# Patient Record
Sex: MALE | Race: BLACK OR AFRICAN AMERICAN | NOT HISPANIC OR LATINO | Employment: FULL TIME | URBAN - METROPOLITAN AREA
[De-identification: names, ages, dates, MRNs, and addresses within clinical notes are randomized per-mention and may not be internally consistent; named-entity substitution may affect disease eponyms.]

---

## 2019-07-03 ENCOUNTER — PATIENT OUTREACH (OUTPATIENT)
Dept: ADMINISTRATIVE | Facility: HOSPITAL | Age: 44
End: 2019-07-03

## 2019-07-03 DIAGNOSIS — Z13.6 ENCOUNTER FOR SCREENING FOR CARDIOVASCULAR DISORDERS: Primary | ICD-10-CM

## 2019-07-17 ENCOUNTER — HOSPITAL ENCOUNTER (OUTPATIENT)
Dept: RADIOLOGY | Facility: HOSPITAL | Age: 44
Discharge: HOME | End: 2019-07-17
Attending: INTERNAL MEDICINE
Payer: COMMERCIAL

## 2019-07-17 ENCOUNTER — OFFICE VISIT (OUTPATIENT)
Dept: FAMILY MEDICINE | Facility: CLINIC | Age: 44
End: 2019-07-17
Payer: COMMERCIAL

## 2019-07-17 VITALS
BODY MASS INDEX: 29.07 KG/M2 | SYSTOLIC BLOOD PRESSURE: 118 MMHG | DIASTOLIC BLOOD PRESSURE: 70 MMHG | OXYGEN SATURATION: 97 % | WEIGHT: 196.31 LBS | TEMPERATURE: 98 F | HEART RATE: 63 BPM | HEIGHT: 69 IN

## 2019-07-17 DIAGNOSIS — G89.29 CHRONIC NECK PAIN: ICD-10-CM

## 2019-07-17 DIAGNOSIS — M54.2 CHRONIC NECK PAIN: ICD-10-CM

## 2019-07-17 DIAGNOSIS — Z00.00 ROUTINE ADULT HEALTH MAINTENANCE: Primary | ICD-10-CM

## 2019-07-17 DIAGNOSIS — Z23 NEED FOR TDAP VACCINATION: ICD-10-CM

## 2019-07-17 DIAGNOSIS — Z12.5 SCREENING FOR PROSTATE CANCER: ICD-10-CM

## 2019-07-17 DIAGNOSIS — E66.3 OVERWEIGHT (BMI 25.0-29.9): ICD-10-CM

## 2019-07-17 PROBLEM — M50.30 DEGENERATIVE DISC DISEASE, CERVICAL: Status: ACTIVE | Noted: 2019-07-17

## 2019-07-17 PROBLEM — E78.5 DYSLIPIDEMIA: Status: ACTIVE | Noted: 2019-07-17

## 2019-07-17 PROCEDURE — 72040 X-RAY EXAM NECK SPINE 2-3 VW: CPT | Mod: 26,,, | Performed by: RADIOLOGY

## 2019-07-17 PROCEDURE — 99386 PR PREVENTIVE VISIT,NEW,40-64: ICD-10-PCS | Mod: 25,S$GLB,, | Performed by: INTERNAL MEDICINE

## 2019-07-17 PROCEDURE — 90715 TDAP VACCINE GREATER THAN OR EQUAL TO 7YO IM: ICD-10-PCS | Mod: S$GLB,,, | Performed by: INTERNAL MEDICINE

## 2019-07-17 PROCEDURE — 99999 PR PBB SHADOW E&M-EST. PATIENT-LVL III: CPT | Mod: PBBFAC,,, | Performed by: INTERNAL MEDICINE

## 2019-07-17 PROCEDURE — 90471 IMMUNIZATION ADMIN: CPT | Mod: S$GLB,,, | Performed by: INTERNAL MEDICINE

## 2019-07-17 PROCEDURE — 90471 TDAP VACCINE GREATER THAN OR EQUAL TO 7YO IM: ICD-10-PCS | Mod: S$GLB,,, | Performed by: INTERNAL MEDICINE

## 2019-07-17 PROCEDURE — 90715 TDAP VACCINE 7 YRS/> IM: CPT | Mod: S$GLB,,, | Performed by: INTERNAL MEDICINE

## 2019-07-17 PROCEDURE — 72040 XR CERVICAL SPINE AP LATERAL: ICD-10-PCS | Mod: 26,,, | Performed by: RADIOLOGY

## 2019-07-17 PROCEDURE — 72040 X-RAY EXAM NECK SPINE 2-3 VW: CPT | Mod: TC,FY,PO

## 2019-07-17 PROCEDURE — 99999 PR PBB SHADOW E&M-EST. PATIENT-LVL III: ICD-10-PCS | Mod: PBBFAC,,, | Performed by: INTERNAL MEDICINE

## 2019-07-17 PROCEDURE — 99386 PREV VISIT NEW AGE 40-64: CPT | Mod: 25,S$GLB,, | Performed by: INTERNAL MEDICINE

## 2019-07-17 NOTE — PROGRESS NOTES
Subjective:       Patient ID: Paul Frank is a 43 y.o. male.    Chief Complaint: Establish Care and Neck Pain (stiffness over the past x 1 year )    Chief Complaint   Patient presents with    Establish Care    Neck Pain     stiffness over the past x 1 year        HPI  Paul Frank is a 43 y.o. male with multiple medical diagnoses as listed in the medical history and problem list that presents for establishment of care.       Neck Pain    Pertinent negatives include no chest pain, headaches, numbness, tingling, trouble swallowing or weakness.   jhoan Rodriguez  Also has occ right shoulder pain   History of YUMI of R shoulder - no rotator cuff tear  Every so often with neck stiffness   Doesn't take meds        PAST MEDICAL HISTORY:  Past Medical History:   Diagnosis Date    Abnormal liver enzymes        PAST SURGICAL HISTORY:  Past Surgical History:   Procedure Laterality Date    FOOT FRACTURE SURGERY         SOCIAL HISTORY:  Social History     Socioeconomic History    Marital status:      Spouse name: john    Number of children: 2    Years of education: Not on file    Highest education level: Not on file   Occupational History    Occupation:      Employer: [employer]   Social Needs    Financial resource strain: Not hard at all    Food insecurity:     Worry: Never true     Inability: Never true    Transportation needs:     Medical: No     Non-medical: No   Tobacco Use    Smoking status: Never Smoker    Smokeless tobacco: Never Used   Substance and Sexual Activity    Alcohol use: Yes     Alcohol/week: 0.0 oz     Types: 1 - 2 Cans of beer per week     Frequency: Monthly or less     Drinks per session: 3 or 4     Binge frequency: Never     Comment: socially    Drug use: No    Sexual activity: Yes     Partners: Female   Lifestyle    Physical activity:     Days per week: 3 days     Minutes per session: 60 min    Stress: Not at all   Relationships    Social connections:     Talks on phone:  Twice a week     Gets together: Once a week     Attends [employer] service: Not on file     Active member of club or organization: Yes     Attends meetings of clubs or organizations: More than 4 times per year     Relationship status:    Other Topics Concern    Not on file   Social History Narrative    He exercises regularly.    He is a  - [place]       FAMILY HISTORY:  Family History   Problem Relation Age of Onset    Hyperlipidemia Mother     Prostate cancer Father     Coronary artery disease Father         late 60s/early 70s    Cancer Sister         lung cancer - 50s    No Known Problems Brother     Diabetes Maternal Grandfather     Colon cancer Neg Hx        ALLERGIES AND MEDICATIONS: updated and reviewed.  Review of patient's allergies indicates:  No Known Allergies  Current Outpatient Medications   Medication Sig Dispense Refill    ketoconazole 2 % Foam Use on affected area twice daily 100 g 3     No current facility-administered medications for this visit.          ROS  Review of Systems   Constitutional: Negative for activity change and unexpected weight change.   HENT: Negative for hearing loss, rhinorrhea and trouble swallowing.    Eyes: Negative for discharge and visual disturbance.   Respiratory: Negative for chest tightness and wheezing.    Cardiovascular: Negative for chest pain and palpitations.   Gastrointestinal: Negative for blood in stool, constipation, diarrhea and vomiting.   Endocrine: Negative for polydipsia and polyuria.   Genitourinary: Negative for difficulty urinating, hematuria and urgency.   Musculoskeletal: Positive for neck pain. Negative for arthralgias and joint swelling.   Neurological: Negative for tingling, weakness, numbness and headaches.   Psychiatric/Behavioral: Negative for confusion and dysphoric mood.           Objective:     Physical Exam  Vitals:    07/17/19 0832   BP: 118/70   BP Location: Left arm   Patient Position: Sitting   BP Method: Medium  "(Manual)   Pulse: 63   Temp: 98.3 °F (36.8 °C)   TempSrc: Oral   SpO2: 97%   Weight: 89 kg (196 lb 5.1 oz)   Height: 5' 9" (1.753 m)    Body mass index is 28.99 kg/m².  Weight: 89 kg (196 lb 5.1 oz)   Height: 5' 9" (175.3 cm)   Physical Exam   Constitutional: He appears well-developed and well-nourished. No distress.   HENT:   Head: Normocephalic and atraumatic.   Eyes: Pupils are equal, round, and reactive to light. Conjunctivae and EOM are normal.   Neck: Normal range of motion. Neck supple. No thyromegaly present.   Cardiovascular: Normal rate.   Pulmonary/Chest: Effort normal and breath sounds normal. No stridor. No respiratory distress. He has no wheezes. He has no rales.   Abdominal: Soft. He exhibits no distension. There is no tenderness. There is no guarding.   Musculoskeletal: He exhibits tenderness (R proximal trapezius).   Pain with right lateral side bending (left sided)   Neurological: He is alert. He displays normal reflexes. No cranial nerve deficit or sensory deficit. He exhibits normal muscle tone.   Skin: Skin is warm and dry. No rash noted.   Psychiatric: He has a normal mood and affect. His behavior is normal.         Assessment:     1. Routine adult health maintenance    2. Chronic neck pain    3. Need for Tdap vaccination    4. Overweight (BMI 25.0-29.9)    5. Screening for prostate cancer      Plan:     Paul was seen today for establish care and neck pain.    Diagnoses and all orders for this visit:    Routine adult health maintenance  Screening for prostate cancer  Discussed healthy diet, regular exercise, necessary labs, age appropriate cancer screening, and routine vaccinations.    -     CBC auto differential; Future  -     Comprehensive metabolic panel; Future  -     Lipid panel; Future  -     Hemoglobin A1c; Future  -     PSA, screening    Chronic neck pain  Patient with mild intermittent neck pain likely secondary to muscular strain  Will obtain imaging to evaluate for " osteoarthritis  Handout provided regarding stretching and conservative therapies  -     X-Ray Cervical Spine AP And Lateral; Future    Need for Tdap vaccination  Counseled regarding Tdap vaccination - administered  -     (In Office Administered) Tdap Vaccine    Overweight (BMI 25.0-29.9)  Body mass index is 28.99 kg/m².   We discussed dietary interventions for the management of weight and reduction of risk for chronic metabolic disease        Health Maintenance       Date Due Completion Date    TETANUS VACCINE 11/06/1991 ---    Lipid Panel 11/16/2017 11/16/2012    Influenza Vaccine 08/01/2019 11/12/2012          Health Maintenance reviewed, addressed as per orders    Follow-up: Follow up in about 1 year (around 7/17/2020) for routine physical.    The patient expressed understanding and no barriers to adherence were identified.     1. The patient indicates understanding of these issues and agrees with the plan. Brief care plan is updated and reviewed with the patient as applicable.     2. The patient is given an After Visit Summary that lists all medications with directions, allergies, orders placed during this encounter and follow-up instructions.     3. I have reviewed the patient's medical information including past medical, family, and social history sections including the medications and allergies.     4. We discussed the patient's current medications. I reconciled the patient's medication list and prepared and supplied needed refills.     Que Terrell MD  Internal Medicine-Pediatrics

## 2020-10-23 ENCOUNTER — OFFICE VISIT (OUTPATIENT)
Dept: FAMILY MEDICINE | Facility: CLINIC | Age: 45
End: 2020-10-23
Payer: COMMERCIAL

## 2020-10-23 ENCOUNTER — LAB VISIT (OUTPATIENT)
Dept: LAB | Facility: HOSPITAL | Age: 45
End: 2020-10-23
Attending: INTERNAL MEDICINE
Payer: COMMERCIAL

## 2020-10-23 VITALS
SYSTOLIC BLOOD PRESSURE: 122 MMHG | HEIGHT: 69 IN | BODY MASS INDEX: 26.96 KG/M2 | HEART RATE: 70 BPM | WEIGHT: 182 LBS | TEMPERATURE: 98 F | OXYGEN SATURATION: 97 % | DIASTOLIC BLOOD PRESSURE: 80 MMHG

## 2020-10-23 DIAGNOSIS — Z00.00 ROUTINE ADULT HEALTH MAINTENANCE: ICD-10-CM

## 2020-10-23 DIAGNOSIS — Z00.00 ROUTINE ADULT HEALTH MAINTENANCE: Primary | ICD-10-CM

## 2020-10-23 DIAGNOSIS — E78.5 DYSLIPIDEMIA: ICD-10-CM

## 2020-10-23 DIAGNOSIS — Z12.5 SCREENING FOR PROSTATE CANCER: ICD-10-CM

## 2020-10-23 DIAGNOSIS — E66.3 OVERWEIGHT (BMI 25.0-29.9): ICD-10-CM

## 2020-10-23 DIAGNOSIS — Z23 NEED FOR INFLUENZA VACCINATION: ICD-10-CM

## 2020-10-23 LAB
ALBUMIN SERPL BCP-MCNC: 4.3 G/DL (ref 3.5–5.2)
ALP SERPL-CCNC: 100 U/L (ref 55–135)
ALT SERPL W/O P-5'-P-CCNC: 31 U/L (ref 10–44)
ANION GAP SERPL CALC-SCNC: 8 MMOL/L (ref 8–16)
AST SERPL-CCNC: 30 U/L (ref 10–40)
BASOPHILS # BLD AUTO: 0.03 K/UL (ref 0–0.2)
BASOPHILS NFR BLD: 0.6 % (ref 0–1.9)
BILIRUB DIRECT SERPL-MCNC: 0.3 MG/DL (ref 0.1–0.3)
BILIRUB SERPL-MCNC: 0.6 MG/DL (ref 0.1–1)
BUN SERPL-MCNC: 12 MG/DL (ref 6–20)
CALCIUM SERPL-MCNC: 9.4 MG/DL (ref 8.7–10.5)
CHLORIDE SERPL-SCNC: 106 MMOL/L (ref 95–110)
CHOLEST SERPL-MCNC: 189 MG/DL (ref 120–199)
CHOLEST/HDLC SERPL: 3.9 {RATIO} (ref 2–5)
CO2 SERPL-SCNC: 27 MMOL/L (ref 23–29)
COMPLEXED PSA SERPL-MCNC: 0.66 NG/ML (ref 0–4)
CREAT SERPL-MCNC: 1.2 MG/DL (ref 0.5–1.4)
DIFFERENTIAL METHOD: ABNORMAL
EOSINOPHIL # BLD AUTO: 0.1 K/UL (ref 0–0.5)
EOSINOPHIL NFR BLD: 1.7 % (ref 0–8)
ERYTHROCYTE [DISTWIDTH] IN BLOOD BY AUTOMATED COUNT: 15.2 % (ref 11.5–14.5)
EST. GFR  (AFRICAN AMERICAN): >60 ML/MIN/1.73 M^2
EST. GFR  (NON AFRICAN AMERICAN): >60 ML/MIN/1.73 M^2
GLUCOSE SERPL-MCNC: 78 MG/DL (ref 70–110)
HCT VFR BLD AUTO: 45.6 % (ref 40–54)
HDLC SERPL-MCNC: 49 MG/DL (ref 40–75)
HDLC SERPL: 25.9 % (ref 20–50)
HGB BLD-MCNC: 13.5 G/DL (ref 14–18)
IMM GRANULOCYTES # BLD AUTO: 0 K/UL (ref 0–0.04)
IMM GRANULOCYTES NFR BLD AUTO: 0 % (ref 0–0.5)
LDLC SERPL CALC-MCNC: 128.2 MG/DL (ref 63–159)
LYMPHOCYTES # BLD AUTO: 1.1 K/UL (ref 1–4.8)
LYMPHOCYTES NFR BLD: 22.9 % (ref 18–48)
MCH RBC QN AUTO: 24.9 PG (ref 27–31)
MCHC RBC AUTO-ENTMCNC: 29.6 G/DL (ref 32–36)
MCV RBC AUTO: 84 FL (ref 82–98)
MONOCYTES # BLD AUTO: 0.5 K/UL (ref 0.3–1)
MONOCYTES NFR BLD: 9.5 % (ref 4–15)
NEUTROPHILS # BLD AUTO: 3.1 K/UL (ref 1.8–7.7)
NEUTROPHILS NFR BLD: 65.3 % (ref 38–73)
NONHDLC SERPL-MCNC: 140 MG/DL
NRBC BLD-RTO: 0 /100 WBC
PLATELET # BLD AUTO: 310 K/UL (ref 150–350)
PMV BLD AUTO: 10.8 FL (ref 9.2–12.9)
POTASSIUM SERPL-SCNC: 4.6 MMOL/L (ref 3.5–5.1)
PROT SERPL-MCNC: 7.3 G/DL (ref 6–8.4)
RBC # BLD AUTO: 5.42 M/UL (ref 4.6–6.2)
SODIUM SERPL-SCNC: 141 MMOL/L (ref 136–145)
TRIGL SERPL-MCNC: 59 MG/DL (ref 30–150)
WBC # BLD AUTO: 4.75 K/UL (ref 3.9–12.7)

## 2020-10-23 PROCEDURE — 99396 PR PREVENTIVE VISIT,EST,40-64: ICD-10-PCS | Mod: 25,S$GLB,, | Performed by: INTERNAL MEDICINE

## 2020-10-23 PROCEDURE — 85025 COMPLETE CBC W/AUTO DIFF WBC: CPT

## 2020-10-23 PROCEDURE — 80048 BASIC METABOLIC PNL TOTAL CA: CPT

## 2020-10-23 PROCEDURE — 90471 IMMUNIZATION ADMIN: CPT | Mod: S$GLB,,, | Performed by: INTERNAL MEDICINE

## 2020-10-23 PROCEDURE — 36415 COLL VENOUS BLD VENIPUNCTURE: CPT | Mod: PO

## 2020-10-23 PROCEDURE — 99999 PR PBB SHADOW E&M-EST. PATIENT-LVL III: CPT | Mod: PBBFAC,,, | Performed by: INTERNAL MEDICINE

## 2020-10-23 PROCEDURE — 99396 PREV VISIT EST AGE 40-64: CPT | Mod: 25,S$GLB,, | Performed by: INTERNAL MEDICINE

## 2020-10-23 PROCEDURE — 80076 HEPATIC FUNCTION PANEL: CPT

## 2020-10-23 PROCEDURE — 84153 ASSAY OF PSA TOTAL: CPT

## 2020-10-23 PROCEDURE — 90686 FLU VACCINE (QUAD) GREATER THAN OR EQUAL TO 3YO PRESERVATIVE FREE IM: ICD-10-PCS | Mod: S$GLB,,, | Performed by: INTERNAL MEDICINE

## 2020-10-23 PROCEDURE — 99999 PR PBB SHADOW E&M-EST. PATIENT-LVL III: ICD-10-PCS | Mod: PBBFAC,,, | Performed by: INTERNAL MEDICINE

## 2020-10-23 PROCEDURE — 80061 LIPID PANEL: CPT

## 2020-10-23 PROCEDURE — 90471 FLU VACCINE (QUAD) GREATER THAN OR EQUAL TO 3YO PRESERVATIVE FREE IM: ICD-10-PCS | Mod: S$GLB,,, | Performed by: INTERNAL MEDICINE

## 2020-10-23 PROCEDURE — 90686 IIV4 VACC NO PRSV 0.5 ML IM: CPT | Mod: S$GLB,,, | Performed by: INTERNAL MEDICINE

## 2020-10-23 NOTE — PATIENT INSTRUCTIONS
Prevention Guidelines, Men Ages 40 to 49  Screening tests and vaccines are an important part of managing your health. Health counseling is essential, too. Below are guidelines for these, for men ages 40 to 49. Talk with your healthcare provider to make sure youre up to date on what you need.  Screening Who needs it How often   Alcohol misuse All men in this age group At routine exams   Blood pressure All men in this age group Every 2 years if your blood pressure reading is less than 120/80 mm Hg; yearly if your systolic blood pressure reading is 120 to 139 mm Hg, or your diastolic blood pressure reading is 80 to 89 mm Hg   Depression All men in this age group At routine exams   Type 2 diabetes or prediabetes All adults beginning at age 45 and adults without symptoms at any age who are overweight or obese and have 1 or more other risk factors for diabetes At least every 3 years (yearly if blood sugar has begun to rise)   Hepatitis C Men at increased risk for infection - talk with your healthcare provider At routine exams   High cholesterol or triglycerides All men ages 35 and older, and younger men at high risk for coronary artery disease At least every 5 years   HIV All men At routine exams   Obesity All men in this age group At routine exams   Prostate cancer Starting at age 45, talk to healthcare provider about risks and benefits of digital rectal exam (BORIS) and prostate-specific antigen (PSA) screening1 At routine exams   Syphilis Men at increased risk for infection - talk with your healthcare provider At routine exams   Tuberculosis Men at increased risk for infection - talk with your healthcare provider Check with your healthcare provider   Vision All men in this age group Every 2 to 4 years if no risk factors for eye disease2   Vaccine Who needs it How often   Chickenpox (varicella) All men in this age group who have no record of this infection or vaccine 2 doses; the second dose should be given at least 4  weeks after the first dose   Hepatitis A Men at increased risk for infection - talk with your healthcare provider 2 doses given at least 6 months apart   Hepatitis B Men at increased risk for infection - talk with your healthcare provider 3 doses over 6 months; second dose should be given 1 month after the first dose; the third dose should be given at least 2 months after the second dose and at least 4 months after the first dose   Haemophilus influenzae Type B (HIB) Men at increased risk for infection - talk with your healthcare provider 1 to 3 doses   Influenza (flu) All men in this age group Once a year   Measles, mumps, rubella (MMR) All men in this age group who have no record of these infections or vaccines 1 or 2 doses   Meningococcal Men at increased risk for infection - talk with your healthcare provider 1 or more doses   Pneumococcal conjugate vaccine (PCV13) and pneumococcal polysaccharide vaccine (PPSV23) Men at increased risk for infection - talk with your healthcare provider PCV13: 1 dose ages 19 to 65 (protects against 13 types of pneumococcal bacteria)     PPSV23: 1 to 2 doses through age 64, or 1 dose at 65 or older (protects against 23 types of pneumococcal bacteria)      Tetanus/diphtheria/  pertussis (Td/Tdap) booster All men in this age group Td every 10 years, or a one-time dose of Tdap instead of a Td booster after age 18, then Td every 10 years   Counseling Who needs it How often   Diet and exercise Men who are overweight or obese When diagnosed, and then at routine exams   Sexually transmitted infection prevention Men at increased risk for infection - talk with your healthcare provider At routine exams   Use of daily aspirin Men ages 45 to 79 at risk for cardiovascular health problems At routine exams   Use of tobacco and the health effects it can cause All men in this age group Every exam   [address] [place] [address] [facility]   [facility] [employer] [facility]  Date Last Reviewed:  2/1/2017  © 2194-2629 [employer]. [address] [place] [address], [place] [address]. All rights reserved. This information is not intended as a substitute for professional medical care. Always follow your healthcare professional's instructions.

## 2020-10-23 NOTE — PROGRESS NOTES
Subjective:       Chief Complaint  Chief Complaint   Patient presents with    Annual Exam       HPI  Paul Frank is a 44 y.o. male with multiple medical diagnoses as listed in the medical history and problem list that presents for above complaint(s).    Has been exercising multiple times a week  Has changed his eating habits significantly  Eats lean meats, egg whites, fish, lean protein  No major symptoms other than occasional knee pain    Patient Care Team:  Que Terrell MD as PCP - General (Internal Medicine)  Sav Bran MD as Consulting Physician (Ophthalmology)  Alysha Cheung LPN as Care Coordinator      PAST MEDICAL HISTORY:  Past Medical History:   Diagnosis Date    Abnormal liver enzymes        PAST SURGICAL HISTORY:  Past Surgical History:   Procedure Laterality Date    FOOT FRACTURE SURGERY         SOCIAL HISTORY:  Social History     Socioeconomic History    Marital status:      Spouse name: john    Number of children: 2    Years of education: Not on file    Highest education level: Not on file   Occupational History    Occupation:      Employer: [employer]   Social Needs    Financial resource strain: Not hard at all    Food insecurity     Worry: Never true     Inability: Never true    Transportation needs     Medical: No     Non-medical: No   Tobacco Use    Smoking status: Never Smoker    Smokeless tobacco: Never Used   Substance and Sexual Activity    Alcohol use: Yes     Alcohol/week: 0.0 standard drinks     Types: 1 - 2 Cans of beer per week     Frequency: Monthly or less     Drinks per session: 1 or 2     Binge frequency: Never     Comment: socially    Drug use: No    Sexual activity: Yes     Partners: Female   Lifestyle    Physical activity     Days per week: 5 days     Minutes per session: 40 min    Stress: To some extent   Relationships    Social connections     Talks on phone: Once a week     Gets together: Once a week     Attends Yazdanism service:  "Not on file     Active member of club or organization: Yes     Attends meetings of clubs or organizations: 1 to 4 times per year     Relationship status:    Other Topics Concern    Not on file   Social History Narrative    He exercises regularly.    He is a  - [employer]       FAMILY HISTORY:  Family History   Problem Relation Age of Onset    Hyperlipidemia Mother     Prostate cancer Father     Coronary artery disease Father         late 60s/early 70s    Cancer Sister         lung cancer - 50s    No Known Problems Brother     Diabetes Maternal Grandfather     Colon cancer Neg Hx        ALLERGIES AND MEDICATIONS: updated and reviewed.  Review of patient's allergies indicates:  No Known Allergies  Current Outpatient Medications   Medication Sig Dispense Refill    ketoconazole 2 % Foam Use on affected area twice daily 100 g 3     No current facility-administered medications for this visit.          ROS  Review of Systems   Constitutional: Negative.    Eyes: Positive for visual disturbance (wears glasses).   Respiratory: Negative.    Genitourinary: Negative.    Musculoskeletal: Positive for arthralgias (knees, right elbow). Negative for back pain.   Hematological: Negative.    Psychiatric/Behavioral: Negative.  Negative for dysphoric mood. The patient is not nervous/anxious.          Objective:       Physical Exam  Vitals:    10/23/20 0822   BP: 122/80   BP Location: Left arm   Patient Position: Sitting   BP Method: Large (Manual)   Pulse: 70   Temp: 98 °F (36.7 °C)   TempSrc: Oral   SpO2: 97%   Weight: 82.6 kg (181 lb 15.8 oz)   Height: 5' 9" (1.753 m)    Body mass index is 26.88 kg/m².  Weight: 82.6 kg (181 lb 15.8 oz)   Height: 5' 9" (175.3 cm)   Physical Exam  Vitals signs reviewed.   Constitutional:       General: He is not in acute distress.     Appearance: He is well-developed.   HENT:      Head: Normocephalic and atraumatic.      Right Ear: External ear normal.      Left Ear: External ear " normal.      Nose: Nose normal.   Eyes:      Pupils: Pupils are equal, round, and reactive to light.   Neck:      Musculoskeletal: Normal range of motion and neck supple.      Thyroid: No thyromegaly.   Cardiovascular:      Rate and Rhythm: Normal rate.      Heart sounds: Normal heart sounds. No murmur.   Pulmonary:      Effort: Pulmonary effort is normal. No respiratory distress.      Breath sounds: Normal breath sounds. No stridor.   Abdominal:      General: Bowel sounds are normal. There is no distension.      Palpations: Abdomen is soft. There is no mass.      Tenderness: There is no abdominal tenderness. There is no guarding.      Hernia: No hernia is present.   Musculoskeletal: Normal range of motion.         General: No tenderness.   Skin:     General: Skin is warm and dry.      Findings: No erythema or rash.   Neurological:      Mental Status: He is alert.      Cranial Nerves: No cranial nerve deficit.   Psychiatric:         Behavior: Behavior normal.             Assessment:     1. Routine adult health maintenance    2. Overweight (BMI 25.0-29.9)    3. Dyslipidemia    4. Screening for prostate cancer    5. Need for influenza vaccination      Plan:     Paul was seen today for annual exam.    Diagnoses and all orders for this visit:    Routine adult health maintenance  Overweight (BMI 25.0-29.9)  Discussed healthy diet, regular exercise, necessary labs, age appropriate cancer screening, and routine vaccinations.    Discussed colon cancer screening consideration for current age interval  Patient to check insurance regarding coverage   -     CBC auto differential; Future  -     Basic Metabolic Panel; Future  -     Hepatic Function Panel; Future    Dyslipidemia  Noted elevated lipids in 2019 - repeat presently  Discussed dietary interventions  -     Lipid Panel; Future    Screening for prostate cancer  Family history of prostate cancer - obtain PSA  -     PSA, Screening; Future    Need for influenza  vaccination  Counseled regarding seasonal influenza vaccination - administered  -     Influenza - Quadrivalent *Preferred* (6 months+) (PF)          Health Maintenance       Date Due Completion Date    Influenza Vaccine (1) 08/01/2020 11/12/2012    Lipid Panel 07/17/2024 7/17/2019    TETANUS VACCINE 07/17/2029 7/17/2019            Health Maintenance reviewed, addressed as per orders    Follow up in about 1 year (around 10/23/2021) for CPE.    The patient expressed understanding and no barriers to adherence were identified.     1. The patient indicates understanding of these issues and agrees with the plan. Brief care plan is updated and reviewed with the patient as applicable.     2. The patient is given an After Visit Summary that lists all medications with directions, allergies, orders placed during this encounter and follow-up instructions.     3. I have reviewed the patient's medical information including past medical, family, and social history sections including the medications and allergies.     4. We discussed the patient's current medications. I reconciled the patient's medication list and prepared and supplied needed refills.       Que Terrell MD  Internal Medicine-Pediatrics

## 2021-04-12 ENCOUNTER — PATIENT MESSAGE (OUTPATIENT)
Dept: RESEARCH | Facility: HOSPITAL | Age: 46
End: 2021-04-12

## 2022-04-13 DIAGNOSIS — Z12.11 COLON CANCER SCREENING: ICD-10-CM

## 2022-05-31 ENCOUNTER — PATIENT MESSAGE (OUTPATIENT)
Dept: ADMINISTRATIVE | Facility: HOSPITAL | Age: 47
End: 2022-05-31
Payer: COMMERCIAL

## 2022-05-31 ENCOUNTER — PATIENT OUTREACH (OUTPATIENT)
Dept: ADMINISTRATIVE | Facility: HOSPITAL | Age: 47
End: 2022-05-31
Payer: COMMERCIAL

## 2022-05-31 NOTE — PROGRESS NOTES
HM and immunizations reviewed/ updated.  [employer] will be mailed out to patient and patient is aware.

## 2022-07-03 DIAGNOSIS — M25.561 PAIN IN BOTH KNEES, UNSPECIFIED CHRONICITY: Primary | ICD-10-CM

## 2022-07-03 DIAGNOSIS — M25.562 PAIN IN BOTH KNEES, UNSPECIFIED CHRONICITY: Primary | ICD-10-CM

## 2022-07-07 ENCOUNTER — OFFICE VISIT (OUTPATIENT)
Dept: ORTHOPEDICS | Facility: CLINIC | Age: 47
End: 2022-07-07
Attending: ORTHOPAEDIC SURGERY
Payer: COMMERCIAL

## 2022-07-07 VITALS
HEIGHT: 69 IN | HEART RATE: 70 BPM | OXYGEN SATURATION: 99 % | WEIGHT: 181 LBS | SYSTOLIC BLOOD PRESSURE: 120 MMHG | RESPIRATION RATE: 15 BRPM | BODY MASS INDEX: 26.81 KG/M2 | DIASTOLIC BLOOD PRESSURE: 77 MMHG

## 2022-07-07 DIAGNOSIS — S76.119A STRAIN OF QUADRICEPS, UNSPECIFIED LATERALITY, INITIAL ENCOUNTER: Primary | ICD-10-CM

## 2022-07-07 PROCEDURE — 99999 PR PBB SHADOW E&M-EST. PATIENT-LVL III: ICD-10-PCS | Mod: PBBFAC,,, | Performed by: ORTHOPAEDIC SURGERY

## 2022-07-07 PROCEDURE — 99203 OFFICE O/P NEW LOW 30 MIN: CPT | Mod: S$GLB,,, | Performed by: ORTHOPAEDIC SURGERY

## 2022-07-07 PROCEDURE — 99203 PR OFFICE/OUTPT VISIT, NEW, LEVL III, 30-44 MIN: ICD-10-PCS | Mod: S$GLB,,, | Performed by: ORTHOPAEDIC SURGERY

## 2022-07-07 PROCEDURE — 99999 PR PBB SHADOW E&M-EST. PATIENT-LVL III: CPT | Mod: PBBFAC,,, | Performed by: ORTHOPAEDIC SURGERY

## 2022-07-07 NOTE — PROGRESS NOTES
NEW PATIENT ORTHOPAEDIC: Knee    PRIMARY CARE PHYSICIAN: Que Terrell MD   REFERRING PROVIDER: Esau Ernst MD  [address] [facility]  [place] [address]     ASSESSMENT & PLAN:    Impression:  Bilateral Knee Quadriceps tendonitis     Follow Up Plan: PRN     Non operative care:    Paul Frank has physical exam evidence of above and wishes to pursue an non-operative care. I am recommending the following: activity modification.  Patient reports a 1 week history of bilateral knee pain.  This pains over the anterior portion of his knee.  It is reproduced with deep knee flexion and squatting.  He denies any mechanical symptoms.  No instability.  No previous knee pain.  The onset of this was when he was doing squats and began to feel a pull over the anterior lateral aspect of his left knee.  His pain has been gradually improving.  He has not done anything or taking any medications to help with his pain.  He denies any sort of effusion or swelling.  Clinically he has no significant findings.  He localizes some portion of his pain to the quadriceps insertion when I had him do body squats but it is not tender to palpation.  I think with activity modification and observation he will get back to his full activities in the coming weeks.  If not I advised a call we could try oral medicines or see him back for new clinical exam.    The patient has been ordered:  None    CONSULTS:   None    ACTIVE PROBLEM LIST  Patient Active Problem List   Diagnosis    Degenerative disc disease, cervical    Overweight (BMI 25.0-29.9)    Dyslipidemia           SUBJECTIVE    CHIEF COMPLAINT: Knee Pain    HPI:   Paul Frank is a 46 y.o. male here for evaluation and management of bilateral knee pain. There is a specific incident that brought about this pain, squatting. he has had progressive problems with the knee(s) starting 1 weeks ago but is now progressing to interfere with activities which include: exercise    Currently the pain in the  "joint is rated at mild with activity. The pain is intermittent and is located in the knee, located anterior. The pain is described as aching. Relieving factors include rest.     There is not associated Catching, Clicking and Popping.     Paul Frank has no additional complaints.     PROGRESSIVE SYMPTOMS:  Pain limiting ability to stay fit and healthy    FUNCTIONAL STATUS:   Participate in recreational activities     PREVIOUS TREATMENTS:  Medical: None  Physical Therapy: None  Previous Orthopaedic Surgery: None    REVIEW OF SYSTEMS:  PAIN ASSESSMENT:  See HPI.  MUSCULOSKELETAL: See HPI.  OTHER 10 point review of systems is negative except as stated in HPI above    PAST MEDICAL HISTORY   has a past medical history of Abnormal liver enzymes.     PAST SURGICAL HISTORY   has a past surgical history that includes Foot fracture surgery.     FAMILY HISTORY  family history includes Cancer in his sister; Coronary artery disease in his father; Diabetes in his maternal grandfather; Hyperlipidemia in his mother; No Known Problems in his brother; Prostate cancer in his father.     SOCIAL HISTORY   reports that he has never smoked. He has never used smokeless tobacco. He reports current alcohol use. He reports that he does not use drugs.     ALLERGIES   Review of patient's allergies indicates:  No Known Allergies     MEDICATIONS  Current Outpatient Medications on File Prior to Visit   Medication Sig Dispense Refill    ketoconazole 2 % Foam Use on affected area twice daily (Patient not taking: Reported on 7/7/2022) 100 g 3     No current facility-administered medications on file prior to visit.          PHYSICAL EXAM   height is 5' 9" (1.753 m) and weight is 82.1 kg (181 lb). His blood pressure is 120/77 and his pulse is 70. His respiration is 15 and oxygen saturation is 99%.   Body mass index is 26.73 kg/m².      All other systems deferred.  GENERAL:  No acute distress  HABITUS: Normal  GAIT: Non-antalgic  SKIN: Normal     KNEE " EXAM:    bilateral:   Effusion: No joint effusion  TTP: no over Medial/Lateral joint line, MCL, LCL, IT band, Pes bursa   no crepitus with passive knee ROM  Passive ROM: Extension 0, Flexion 130  No pain with manipulation of patella  Stable to varus/valgus stress. No increased laxity to anterior/posterior drawer testing  negative Allison's test  No pain with IR/ER rotation of the hip  5/5 strength in knee flexion and extension, ankle plantarflexion and dorsiflexion  Neurovascular Status: Sensation intact to light touch in Sural, Saphenous, SPN, DPN, Tibial nerve distribution  2+ pulse DP/PT, normal capillary refill, foot has normal warmth    DATA:  Diagnostic tests reviewed for today's visit:     The obtained knee radiographs appears normal for age. There is good alignment with no evidence of fracture, bony abnormalities or signficant degenerative changes.

## 2022-09-08 ENCOUNTER — OFFICE VISIT (OUTPATIENT)
Dept: FAMILY MEDICINE | Facility: CLINIC | Age: 47
End: 2022-09-08
Payer: COMMERCIAL

## 2022-09-08 ENCOUNTER — LAB VISIT (OUTPATIENT)
Dept: LAB | Facility: HOSPITAL | Age: 47
End: 2022-09-08
Attending: INTERNAL MEDICINE
Payer: COMMERCIAL

## 2022-09-08 VITALS
OXYGEN SATURATION: 95 % | WEIGHT: 183.56 LBS | HEART RATE: 60 BPM | SYSTOLIC BLOOD PRESSURE: 100 MMHG | HEIGHT: 69 IN | TEMPERATURE: 98 F | DIASTOLIC BLOOD PRESSURE: 70 MMHG | BODY MASS INDEX: 27.19 KG/M2

## 2022-09-08 DIAGNOSIS — K64.9 HEMORRHOIDS, UNSPECIFIED HEMORRHOID TYPE: ICD-10-CM

## 2022-09-08 DIAGNOSIS — Z00.00 ROUTINE ADULT HEALTH MAINTENANCE: ICD-10-CM

## 2022-09-08 DIAGNOSIS — M50.30 DEGENERATIVE DISC DISEASE, CERVICAL: ICD-10-CM

## 2022-09-08 DIAGNOSIS — Z00.00 ROUTINE ADULT HEALTH MAINTENANCE: Primary | ICD-10-CM

## 2022-09-08 DIAGNOSIS — Z12.11 COLON CANCER SCREENING: ICD-10-CM

## 2022-09-08 LAB
ALBUMIN SERPL BCP-MCNC: 4.3 G/DL (ref 3.5–5.2)
ALP SERPL-CCNC: 85 U/L (ref 55–135)
ALT SERPL W/O P-5'-P-CCNC: 29 U/L (ref 10–44)
ANION GAP SERPL CALC-SCNC: 8 MMOL/L (ref 8–16)
AST SERPL-CCNC: 27 U/L (ref 10–40)
BASOPHILS # BLD AUTO: 0.02 K/UL (ref 0–0.2)
BASOPHILS NFR BLD: 0.4 % (ref 0–1.9)
BILIRUB SERPL-MCNC: 0.8 MG/DL (ref 0.1–1)
BUN SERPL-MCNC: 14 MG/DL (ref 6–20)
CALCIUM SERPL-MCNC: 9.5 MG/DL (ref 8.7–10.5)
CHLORIDE SERPL-SCNC: 106 MMOL/L (ref 95–110)
CHOLEST SERPL-MCNC: 210 MG/DL (ref 120–199)
CHOLEST/HDLC SERPL: 3.6 {RATIO} (ref 2–5)
CO2 SERPL-SCNC: 25 MMOL/L (ref 23–29)
CREAT SERPL-MCNC: 1.1 MG/DL (ref 0.5–1.4)
DIFFERENTIAL METHOD: ABNORMAL
EOSINOPHIL # BLD AUTO: 0.1 K/UL (ref 0–0.5)
EOSINOPHIL NFR BLD: 2.8 % (ref 0–8)
ERYTHROCYTE [DISTWIDTH] IN BLOOD BY AUTOMATED COUNT: 15.4 % (ref 11.5–14.5)
EST. GFR  (NO RACE VARIABLE): >60 ML/MIN/1.73 M^2
GLUCOSE SERPL-MCNC: 85 MG/DL (ref 70–110)
HCT VFR BLD AUTO: 46.5 % (ref 40–54)
HDLC SERPL-MCNC: 59 MG/DL (ref 40–75)
HDLC SERPL: 28.1 % (ref 20–50)
HGB BLD-MCNC: 14.3 G/DL (ref 14–18)
IMM GRANULOCYTES # BLD AUTO: 0.01 K/UL (ref 0–0.04)
IMM GRANULOCYTES NFR BLD AUTO: 0.2 % (ref 0–0.5)
LDLC SERPL CALC-MCNC: 139.6 MG/DL (ref 63–159)
LYMPHOCYTES # BLD AUTO: 1.5 K/UL (ref 1–4.8)
LYMPHOCYTES NFR BLD: 30.4 % (ref 18–48)
MCH RBC QN AUTO: 25.6 PG (ref 27–31)
MCHC RBC AUTO-ENTMCNC: 30.8 G/DL (ref 32–36)
MCV RBC AUTO: 83 FL (ref 82–98)
MONOCYTES # BLD AUTO: 0.6 K/UL (ref 0.3–1)
MONOCYTES NFR BLD: 11.4 % (ref 4–15)
NEUTROPHILS # BLD AUTO: 2.7 K/UL (ref 1.8–7.7)
NEUTROPHILS NFR BLD: 54.8 % (ref 38–73)
NONHDLC SERPL-MCNC: 151 MG/DL
NRBC BLD-RTO: 0 /100 WBC
PLATELET # BLD AUTO: 336 K/UL (ref 150–450)
PMV BLD AUTO: 10.9 FL (ref 9.2–12.9)
POTASSIUM SERPL-SCNC: 4.1 MMOL/L (ref 3.5–5.1)
PROT SERPL-MCNC: 7.4 G/DL (ref 6–8.4)
RBC # BLD AUTO: 5.58 M/UL (ref 4.6–6.2)
SODIUM SERPL-SCNC: 139 MMOL/L (ref 136–145)
TRIGL SERPL-MCNC: 57 MG/DL (ref 30–150)
WBC # BLD AUTO: 4.93 K/UL (ref 3.9–12.7)

## 2022-09-08 PROCEDURE — 36415 COLL VENOUS BLD VENIPUNCTURE: CPT | Mod: PO | Performed by: INTERNAL MEDICINE

## 2022-09-08 PROCEDURE — 99999 PR PBB SHADOW E&M-EST. PATIENT-LVL IV: ICD-10-PCS | Mod: PBBFAC,,, | Performed by: INTERNAL MEDICINE

## 2022-09-08 PROCEDURE — 80053 COMPREHEN METABOLIC PANEL: CPT | Performed by: INTERNAL MEDICINE

## 2022-09-08 PROCEDURE — 99396 PR PREVENTIVE VISIT,EST,40-64: ICD-10-PCS | Mod: S$GLB,,, | Performed by: INTERNAL MEDICINE

## 2022-09-08 PROCEDURE — 85025 COMPLETE CBC W/AUTO DIFF WBC: CPT | Performed by: INTERNAL MEDICINE

## 2022-09-08 PROCEDURE — 99396 PREV VISIT EST AGE 40-64: CPT | Mod: S$GLB,,, | Performed by: INTERNAL MEDICINE

## 2022-09-08 PROCEDURE — 99999 PR PBB SHADOW E&M-EST. PATIENT-LVL IV: CPT | Mod: PBBFAC,,, | Performed by: INTERNAL MEDICINE

## 2022-09-08 PROCEDURE — 80061 LIPID PANEL: CPT | Performed by: INTERNAL MEDICINE

## 2022-09-08 NOTE — PATIENT INSTRUCTIONS
For COLONOSCOPY/ENDOSCOPY scheduling, please call [phone]  Hours: Monday - Friday 730AM - 5PM

## 2022-09-08 NOTE — PROGRESS NOTES
Subjective:       Chief Complaint  Chief Complaint   Patient presents with    Annual Exam       HPI  Paul Frank is a 46 y.o. male with multiple medical diagnoses as listed in the medical history and problem list that presents for above complaint(s).    Has been exercising multiple times a week  Has changed his eating habits significantly  Eats lean meats, egg whites, fish, lean protein    Still experiencing right knee pain   Had a negative x-ray  previously     Does have work-related stressors at times     Wearing glasses- nearsighted    Blood in stool - had a hemorrhoid a few weeks ago that caused bright red blood in his stool in addition to discomfort with defecation  Has resolved    Occ neck pain - when turning neck to one side     Patient Care Team:  Que Terrell MD as PCP - General (Internal Medicine)  Sav Bran MD as Consulting Physician (Ophthalmology)  Carloz Aguirre MA as Care Coordinator      PAST MEDICAL HISTORY:  Past Medical History:   Diagnosis Date    Abnormal liver enzymes        PAST SURGICAL HISTORY:  Past Surgical History:   Procedure Laterality Date    FOOT FRACTURE SURGERY         SOCIAL HISTORY:  Social History     Socioeconomic History    Marital status:      Spouse name: john    Number of children: 2   Occupational History    Occupation:      Employer: [employer]   Tobacco Use    Smoking status: Never    Smokeless tobacco: Never   Substance and Sexual Activity    Alcohol use: Yes     Alcohol/week: 0.0 standard drinks     Types: 1 - 2 Cans of beer per week     Comment: socially    Drug use: No    Sexual activity: Yes     Partners: Female   Social History Narrative    He exercises regularly.    He is a  - [employer]     Social Determinants of Health     Financial Resource Strain: Low Risk     Difficulty of Paying Living Expenses: Not hard at all   Food Insecurity: No Food Insecurity    Worried About Running Out of Food in the Last Year: Never true    Ran Out of Food  in the Last Year: Never true   Transportation Needs: No Transportation Needs    Lack of Transportation (Medical): No    Lack of Transportation (Non-Medical): No   Physical Activity: Sufficiently Active    Days of Exercise per Week: 5 days    Minutes of Exercise per Session: 90 min   Stress: No Stress Concern Present    Feeling of Stress : Not at all   Social Connections: Unknown    Frequency of Communication with Friends and Family: Twice a week    Frequency of Social Gatherings with Friends and Family: Once a week    Active Member of Clubs or Organizations: Yes    Attends Club or Organization Meetings: More than 4 times per year    Marital Status:    Housing Stability: Low Risk     Unable to Pay for Housing in the Last Year: No    Number of Places Lived in the Last Year: 1    Unstable Housing in the Last Year: No       FAMILY HISTORY:  Family History   Problem Relation Age of Onset    Hyperlipidemia Mother     Prostate cancer Father     Coronary artery disease Father         late 60s/early 70s    Cancer Sister         lung cancer - 50s    No Known Problems Brother     Diabetes Maternal Grandfather     Colon cancer Neg Hx        ALLERGIES AND MEDICATIONS: updated and reviewed.  Review of patient's allergies indicates:  No Known Allergies  No current outpatient medications on file.     No current facility-administered medications for this visit.         ROS  Review of Systems   Constitutional: Negative.  Negative for activity change and unexpected weight change.   HENT:  Negative for hearing loss, rhinorrhea and trouble swallowing.    Eyes:  Positive for visual disturbance (wears glasses). Negative for discharge.   Respiratory: Negative.  Negative for chest tightness and wheezing.    Cardiovascular:  Negative for chest pain and palpitations.   Gastrointestinal:  Positive for blood in stool. Negative for constipation, diarrhea and vomiting.   Endocrine: Negative for polydipsia and polyuria.   Genitourinary:  "Negative.  Negative for difficulty urinating and hematuria.   Musculoskeletal:  Positive for arthralgias (knees, right elbow) and neck pain. Negative for back pain and joint swelling.   Neurological:  Negative for weakness and headaches.   Hematological: Negative.    Psychiatric/Behavioral: Negative.  Negative for confusion and dysphoric mood. The patient is not nervous/anxious.        Objective:       Physical Exam  Vitals:    09/08/22 0751   BP: 100/70   Pulse: 60   Temp: 98 °F (36.7 °C)   TempSrc: Oral   SpO2: 95%   Weight: 83.3 kg (183 lb 8.5 oz)   Height: 5' 9" (1.753 m)    Body mass index is 27.1 kg/m².  Weight: 83.3 kg (183 lb 8.5 oz)   Height: 5' 9" (175.3 cm)   Physical Exam  Vitals reviewed.   Constitutional:       General: He is not in acute distress.     Appearance: He is well-developed.   HENT:      Head: Normocephalic and atraumatic.      Right Ear: External ear normal.      Left Ear: External ear normal.      Nose: Nose normal.   Eyes:      Pupils: Pupils are equal, round, and reactive to light.   Neck:      Thyroid: No thyromegaly.   Cardiovascular:      Rate and Rhythm: Normal rate.      Heart sounds: Normal heart sounds. No murmur heard.  Pulmonary:      Effort: Pulmonary effort is normal. No respiratory distress.      Breath sounds: Normal breath sounds. No stridor.   Abdominal:      General: Bowel sounds are normal. There is no distension.      Palpations: Abdomen is soft. There is no mass.      Tenderness: There is no abdominal tenderness. There is no guarding.      Hernia: No hernia is present.   Musculoskeletal:         General: No tenderness. Normal range of motion.      Cervical back: Normal range of motion and neck supple.   Skin:     General: Skin is warm and dry.      Findings: No erythema or rash.   Neurological:      Mental Status: He is alert.      Cranial Nerves: No cranial nerve deficit.   Psychiatric:         Behavior: Behavior normal.           Assessment:     1. Routine adult " health maintenance    2. Hemorrhoids, unspecified hemorrhoid type    3. Degenerative disc disease, cervical    4. Colon cancer screening        Plan:     Paul was seen today for annual exam.    Diagnoses and all orders for this visit:    Routine adult health maintenance  Overweight (BMI 25.0-29.9)  Discussed healthy diet, regular exercise, necessary labs, age appropriate cancer screening, and routine vaccinations.    Discussed colon cancer screening consideration for current age interval  Patient to check insurance regarding coverage   -     CBC auto differential; Future  -     Basic Metabolic Panel; Future  -     Hepatic Function Panel; Future    Dyslipidemia  Noted elevated lipids in 2019 - repeat presently  Discussed dietary interventions  -     Lipid Panel; Future    Screening for colon cancer  Adults 45 to 75 years of age should be screened for colorectal cancer. Colonoscopy and fecal stool testing discussed with respect to risks and benefits/advantages and disadvantages of each screening method. Patient aware that it is recommended that a positive Cologuard screen or FIT test be clinically correlated and followed-up with a structural examination of the colon such asdiagnostic colonoscopy. Patient consents to colon cancer screening method as follows  - COLONOSCOPY      Health Maintenance         Date Due Completion Date    Influenza Vaccine (1) 08/01/2020 11/12/2012    Lipid Panel 07/17/2024 7/17/2019    TETANUS VACCINE 07/17/2029 7/17/2019              Health Maintenance reviewed, addressed as per orders    No follow-ups on file.    The patient expressed understanding and no barriers to adherence were identified.     1. The patient indicates understanding of these issues and agrees with the plan. Brief care plan is updated and reviewed with the patient as applicable.     2. The patient is given an After Visit Summary that lists all medications with directions, allergies, orders placed during this encounter  and follow-up instructions.     3. I have reviewed the patient's medical information including past medical, family, and social history sections including the medications and allergies.     4. We discussed the patient's current medications. I reconciled the patient's medication list and prepared and supplied needed refills.       Que Terrell MD  Internal Medicine-Pediatrics

## 2022-09-12 ENCOUNTER — PATIENT MESSAGE (OUTPATIENT)
Dept: ADMINISTRATIVE | Facility: HOSPITAL | Age: 47
End: 2022-09-12
Payer: COMMERCIAL

## 2022-11-25 ENCOUNTER — CLINICAL SUPPORT (OUTPATIENT)
Dept: ENDOSCOPY | Facility: HOSPITAL | Age: 47
End: 2022-11-25
Attending: INTERNAL MEDICINE
Payer: COMMERCIAL

## 2022-11-25 VITALS — HEIGHT: 69 IN | WEIGHT: 184 LBS | BODY MASS INDEX: 27.25 KG/M2

## 2022-11-25 DIAGNOSIS — Z12.11 COLON CANCER SCREENING: ICD-10-CM

## 2022-11-25 RX ORDER — POLYETHYLENE GLYCOL 3350, SODIUM SULFATE ANHYDROUS, SODIUM BICARBONATE, SODIUM CHLORIDE, POTASSIUM CHLORIDE 236; 22.74; 6.74; 5.86; 2.97 G/4L; G/4L; G/4L; G/4L; G/4L
4 POWDER, FOR SOLUTION ORAL ONCE
Qty: 4000 ML | Refills: 0 | Status: SHIPPED | OUTPATIENT
Start: 2022-11-25 | End: 2022-11-25

## 2022-12-04 ENCOUNTER — ANESTHESIA EVENT (OUTPATIENT)
Dept: ENDOSCOPY | Facility: HOSPITAL | Age: 47
End: 2022-12-04
Payer: COMMERCIAL

## 2022-12-04 RX ORDER — SODIUM CHLORIDE, SODIUM LACTATE, POTASSIUM CHLORIDE, CALCIUM CHLORIDE 600; 310; 30; 20 MG/100ML; MG/100ML; MG/100ML; MG/100ML
INJECTION, SOLUTION INTRAVENOUS CONTINUOUS
Status: CANCELLED | OUTPATIENT
Start: 2022-12-04

## 2022-12-04 RX ORDER — LIDOCAINE HYDROCHLORIDE 10 MG/ML
1 INJECTION, SOLUTION EPIDURAL; INFILTRATION; INTRACAUDAL; PERINEURAL ONCE
Status: CANCELLED | OUTPATIENT
Start: 2022-12-04 | End: 2022-12-04

## 2022-12-05 ENCOUNTER — HOSPITAL ENCOUNTER (OUTPATIENT)
Facility: HOSPITAL | Age: 47
Discharge: HOME | End: 2022-12-05
Attending: INTERNAL MEDICINE | Admitting: INTERNAL MEDICINE
Payer: COMMERCIAL

## 2022-12-05 ENCOUNTER — ANESTHESIA (OUTPATIENT)
Dept: ENDOSCOPY | Facility: HOSPITAL | Age: 47
End: 2022-12-05
Payer: COMMERCIAL

## 2022-12-05 VITALS
SYSTOLIC BLOOD PRESSURE: 124 MMHG | RESPIRATION RATE: 18 BRPM | TEMPERATURE: 1 F | DIASTOLIC BLOOD PRESSURE: 66 MMHG | HEART RATE: 50 BPM | OXYGEN SATURATION: 100 %

## 2022-12-05 DIAGNOSIS — Z12.11 SCREENING FOR COLON CANCER: Primary | ICD-10-CM

## 2022-12-05 PROCEDURE — 88305 TISSUE EXAM BY PATHOLOGIST: ICD-10-PCS | Mod: 26,,, | Performed by: PATHOLOGY

## 2022-12-05 PROCEDURE — 88305 TISSUE EXAM BY PATHOLOGIST: CPT | Performed by: PATHOLOGY

## 2022-12-05 PROCEDURE — 37000008 HC ANESTHESIA 1ST 15 MINUTES: Performed by: INTERNAL MEDICINE

## 2022-12-05 PROCEDURE — 45380 COLONOSCOPY AND BIOPSY: CPT | Mod: PT | Performed by: INTERNAL MEDICINE

## 2022-12-05 PROCEDURE — 25000003 PHARM REV CODE 250: Performed by: INTERNAL MEDICINE

## 2022-12-05 PROCEDURE — 25000003 PHARM REV CODE 250: Performed by: NURSE ANESTHETIST, CERTIFIED REGISTERED

## 2022-12-05 PROCEDURE — D9220A PRA ANESTHESIA: ICD-10-PCS | Mod: 33,CRNA,, | Performed by: NURSE ANESTHETIST, CERTIFIED REGISTERED

## 2022-12-05 PROCEDURE — D9220A PRA ANESTHESIA: Mod: 33,CRNA,, | Performed by: NURSE ANESTHETIST, CERTIFIED REGISTERED

## 2022-12-05 PROCEDURE — 37000009 HC ANESTHESIA EA ADD 15 MINS: Performed by: INTERNAL MEDICINE

## 2022-12-05 PROCEDURE — 63600175 PHARM REV CODE 636 W HCPCS: Performed by: NURSE ANESTHETIST, CERTIFIED REGISTERED

## 2022-12-05 PROCEDURE — 45380 PR COLONOSCOPY,BIOPSY: ICD-10-PCS | Mod: 33,,, | Performed by: INTERNAL MEDICINE

## 2022-12-05 PROCEDURE — 27201012 HC FORCEPS, HOT/COLD, DISP: Performed by: INTERNAL MEDICINE

## 2022-12-05 PROCEDURE — 45380 COLONOSCOPY AND BIOPSY: CPT | Mod: 33,,, | Performed by: INTERNAL MEDICINE

## 2022-12-05 PROCEDURE — D9220A PRA ANESTHESIA: ICD-10-PCS | Mod: 33,ANES,, | Performed by: ANESTHESIOLOGY

## 2022-12-05 PROCEDURE — 88305 TISSUE EXAM BY PATHOLOGIST: CPT | Mod: 26,,, | Performed by: PATHOLOGY

## 2022-12-05 PROCEDURE — D9220A PRA ANESTHESIA: Mod: 33,ANES,, | Performed by: ANESTHESIOLOGY

## 2022-12-05 RX ORDER — PROPOFOL 10 MG/ML
VIAL (ML) INTRAVENOUS
Status: DISCONTINUED | OUTPATIENT
Start: 2022-12-05 | End: 2022-12-05

## 2022-12-05 RX ORDER — LIDOCAINE HYDROCHLORIDE 20 MG/ML
INJECTION, SOLUTION EPIDURAL; INFILTRATION; INTRACAUDAL; PERINEURAL
Status: DISCONTINUED
Start: 2022-12-05 | End: 2022-12-05 | Stop reason: HOSPADM

## 2022-12-05 RX ORDER — LIDOCAINE HYDROCHLORIDE 20 MG/ML
INJECTION INTRAVENOUS
Status: DISCONTINUED | OUTPATIENT
Start: 2022-12-05 | End: 2022-12-05

## 2022-12-05 RX ORDER — SODIUM CHLORIDE 9 MG/ML
INJECTION, SOLUTION INTRAVENOUS CONTINUOUS
Status: DISCONTINUED | OUTPATIENT
Start: 2022-12-05 | End: 2022-12-05 | Stop reason: HOSPADM

## 2022-12-05 RX ORDER — PROPOFOL 10 MG/ML
INJECTION, EMULSION INTRAVENOUS
Status: DISCONTINUED
Start: 2022-12-05 | End: 2022-12-05 | Stop reason: HOSPADM

## 2022-12-05 RX ADMIN — PROPOFOL 50 MG: 10 INJECTION, EMULSION INTRAVENOUS at 11:12

## 2022-12-05 RX ADMIN — LIDOCAINE HYDROCHLORIDE 100 MG: 20 INJECTION, SOLUTION INTRAVENOUS at 11:12

## 2022-12-05 RX ADMIN — PROPOFOL 200 MG: 10 INJECTION, EMULSION INTRAVENOUS at 11:12

## 2022-12-05 RX ADMIN — SODIUM CHLORIDE: 0.9 INJECTION, SOLUTION INTRAVENOUS at 11:12

## 2022-12-05 NOTE — TRANSFER OF CARE
Anesthesia Transfer of Care Note    Patient: Paul Frank    Procedure(s) Performed: Procedure(s) (LRB):  COLONOSCOPY (N/A)    Patient location: GI    Anesthesia Type: general    Transport from OR: Transported from OR on room air with adequate spontaneous ventilation    Post pain: adequate analgesia    Post assessment: no apparent anesthetic complications    Post vital signs: stable    Level of consciousness: responds to stimulation and sedated    Nausea/Vomiting: no nausea/vomiting    Complications: none    Transfer of care protocol was followed      Last vitals:   Visit Vitals  /66 (BP Location: Left arm, Patient Position: Lying)   Pulse 67   Temp (!) -17.3 °C (0.8 °F) (Axillary)   Resp 16   SpO2 96%

## 2022-12-05 NOTE — PROVATION PATIENT INSTRUCTIONS
Discharge Summary/Instructions after an Endoscopic Procedure  Patient Name: Paul Frank  Patient MRN: [id number]  Patient YOB: 1975 Monday, December 5, 2022  Nicolle Salcedo MD  Dear patient,  As a result of recent federal legislation (The Federal Cures Act), you may   receive lab or pathology results from your procedure in your [facility]   account before your physician is able to contact you. Your physician or   their representative will relay the results to you with their   recommendations at their soonest availability.  Thank you,  RESTRICTIONS:  During your procedure today, you received medications for sedation.  These   medications may affect your judgment, balance and coordination.  Therefore,   for 24 hours, you have the following restrictions:   - DO NOT drive a car, operate machinery, make legal/financial decisions,   sign important papers or drink alcohol.    ACTIVITY:  Today: no heavy lifting, straining or running due to procedural   sedation/anesthesia.  The following day: return to full activity including work.  DIET:  Eat and drink normally unless instructed otherwise.     TREATMENT FOR COMMON SIDE EFFECTS:  - Mild abdominal pain, nausea, belching, bloating or excessive gas:  rest,   eat lightly and use a heating pad.  - Sore Throat: treat with throat lozenges and/or gargle with warm salt   water.  - Because air was used during the procedure, expelling large amounts of air   from your rectum or belching is normal.  - If a bowel prep was taken, you may not have a bowel movement for 1-3 days.    This is normal.  SYMPTOMS TO WATCH FOR AND REPORT TO YOUR PHYSICIAN:  1. Abdominal pain or bloating, other than gas cramps.  2. Chest pain.  3. Back pain.  4. Signs of infection such as: chills or fever occurring within 24 hours   after the procedure.  5. Rectal bleeding, which would show as bright red, maroon, or black stools.   (A tablespoon of blood from the rectum is not serious, especially if    hemorrhoids are present.)  6. Vomiting.  7. Weakness or dizziness.  GO DIRECTLY TO THE NEAREST EMERGENCY ROOM IF YOU HAVE ANY OF THE FOLLOWING:      Difficulty breathing              Chills and/or fever over 101 F   Persistent vomiting and/or vomiting blood   Severe abdominal pain   Severe chest pain   Black, tarry stools   Bleeding- more than one tablespoon   Any other symptom or condition that you feel may need urgent attention  Your doctor recommends these additional instructions:  If any biopsies were taken, your doctors clinic will contact you in 1 to 2   weeks with any results.  - Discharge patient to home.   - High fiber diet  - Continue present medications.   - Await pathology results.   - Repeat colonoscopy for surveillance based on pathology results.  For questions, problems or results please call your physician - Nicolle Salcedo MD at Work:  [phone].  [facility] can be reached at [phone]     IF A COMPLICATION OR EMERGENCY SITUATION ARISES AND YOU ARE UNABLE TO REACH   YOUR PHYSICIAN - GO DIRECTLY TO THE EMERGENCY ROOM.  Nicolle Salcedo MD  12/5/2022 11:32:13 AM  This report has been verified and signed electronically.  Dear patient,  As a result of recent federal legislation (The Federal Cures Act), you may   receive lab or pathology results from your procedure in your [facility]   account before your physician is able to contact you. Your physician or   their representative will relay the results to you with their   recommendations at their soonest availability.  Thank you,  PROVATION

## 2022-12-05 NOTE — H&P
Short Stay Endoscopy History and Physical    PCP - Que Terrell MD    Procedure - Colonoscopy  ASA - per anesthesia  Mallampati - per anesthesia  History of Anesthesia problems - no  Family history Anesthesia problems - no   Plan of anesthesia - General, MAC    HPI:  This is a 47 y.o. male here for evaluation of : asymptomatic screening exam      ROS:  Constitutional: No fevers, chills, No weight loss  CV: No chest pain  Pulm: No cough, No shortness of breath  GI: see HPI  Derm: No rash    Medical History:  has a past medical history of Abnormal liver enzymes.    Surgical History:  has a past surgical history that includes Foot fracture surgery.    Family History: family history includes Cancer in his sister; Coronary artery disease in his father; Diabetes in his maternal grandfather; Hyperlipidemia in his mother; No Known Problems in his brother; Prostate cancer in his father.. Otherwise no colon cancer, inflammatory bowel disease, or GI malignancies.    Social History:  reports that he has never smoked. He has never used smokeless tobacco. He reports current alcohol use. He reports that he does not use drugs.    Review of patient's allergies indicates:  No Known Allergies    Medications:   No medications prior to admission.         Physical Exam:    Vital Signs: There were no vitals filed for this visit.    Gen: NAD, lying comfortably  HENT: NCAT, oropharynx clear  Eyes: anicteric sclerae, EOMI grossly  Neck: supple, no visible masses/goiter  Cardiac: RRR  Lungs: non-labored breathing  Abd: soft, NT/ND, normoactive BS  Ext: no LE edema, warm, well perfused  Skin: skin intact on exposed body parts, no visible rashes, lesions  Neuro: A&Ox4, neuro exam grossly intact, moves all extremities  Psych: appropriate mood, affect        Labs:  Lab Results   Component Value Date    WBC 4.93 09/08/2022    HGB 14.3 09/08/2022    HCT 46.5 09/08/2022     09/08/2022    CHOL 210 (H) 09/08/2022    TRIG 57 09/08/2022     HDL 59 09/08/2022    ALT 29 09/08/2022    AST 27 09/08/2022     09/08/2022    K 4.1 09/08/2022     09/08/2022    CREATININE 1.1 09/08/2022    BUN 14 09/08/2022    CO2 25 09/08/2022    TSH 1.997 11/16/2012    PSA 0.66 10/23/2020    HGBA1C 5.6 07/17/2019       Plan:  Colonoscopy for CRC screening    I have explained the risks and benefits of endoscopy procedures to the patient including but not limited to bleeding, perforation, infection, and death.  The patient was asked if they understand and allowed to ask any further questions to their satisfaction.    Nicolle Salcedo MD

## 2022-12-06 LAB
FINAL PATHOLOGIC DIAGNOSIS: NORMAL
GROSS: NORMAL
Lab: NORMAL

## 2022-12-07 ENCOUNTER — TELEPHONE (OUTPATIENT)
Dept: GASTROENTEROLOGY | Facility: CLINIC | Age: 47
End: 2022-12-07
Payer: COMMERCIAL

## 2022-12-07 DIAGNOSIS — Z12.11 ENCOUNTER FOR SCREENING COLONOSCOPY: Primary | ICD-10-CM

## 2022-12-07 NOTE — TELEPHONE ENCOUNTER
Reviewed colonoscopy biopsy results with patient via phone - normal intestinal tissue    We discussed repeating colonoscopy in ~3 months to take a second look at the area (moreso behind the IC valve) to ensure no polyp is there. This is likely just some protruding small bowel as the path shows however will take a second look to ensure this.    Endo referral placed and message sent to schedulers as well    Nicolle Salcedo MD

## 2022-12-19 NOTE — ANESTHESIA PREPROCEDURE EVALUATION
12/19/2022  Paul Frank is a 47 y.o., male.      Pre-op Assessment     I have reviewed the Nursing Notes.       Review of Systems  Anesthesia Hx:  No problems with previous Anesthesia    Social:  Non-Smoker    Cardiovascular:   Exercise tolerance: good Denies Pacemaker.  Denies CABG/stent.     Pulmonary:  Pulmonary Normal    Renal/:  Renal/ Normal     Hepatic/GI:   Bowel Prep.    Musculoskeletal:   Arthritis     Neurological:  Neurology Normal    Endocrine:  Endocrine Normal        Physical Exam  General: Well nourished, Cooperative, Alert and Oriented    Airway:  Mallampati: III   Mouth Opening: Normal  TM Distance: Normal  Tongue: Normal  Neck ROM: Normal ROM        Anesthesia Plan  Type of Anesthesia, risks & benefits discussed:    Anesthesia Type: Gen Natural Airway  Intra-op Monitoring Plan: Standard ASA Monitors  Induction:  IV  Informed Consent: Informed consent signed with the Patient and all parties understand the risks and agree with anesthesia plan.  All questions answered.   ASA Score: 1  Day of Surgery Review of History & Physical: H&P Update referred to the surgeon/provider.    Ready For Surgery From Anesthesia Perspective.     .

## 2022-12-19 NOTE — ANESTHESIA POSTPROCEDURE EVALUATION
Anesthesia Post Evaluation    Patient: Paul Frank    Procedure(s) Performed: Procedure(s) (LRB):  COLONOSCOPY (N/A)    Final Anesthesia Type: general      Patient location during evaluation: GI PACU  Patient participation: Yes- Able to Participate  Level of consciousness: awake and alert  Post-procedure vital signs: reviewed and stable  Pain management: adequate  Airway patency: patent    PONV status at discharge: No PONV  Anesthetic complications: no      Cardiovascular status: blood pressure returned to baseline and hemodynamically stable  Respiratory status: unassisted and spontaneous ventilation  Hydration status: euvolemic  Follow-up not needed.          Vitals Value Taken Time   /66 12/05/22 1200   Temp -17.3 °C (0.8 °F) 12/05/22 1129   Pulse 50 12/05/22 1200   Resp 18 12/05/22 1200   SpO2 100 % 12/05/22 1200         Event Time   Out of Recovery 12:07:32         Pain/Maikol Score: No data recorded

## 2023-01-24 ENCOUNTER — PATIENT MESSAGE (OUTPATIENT)
Dept: ENDOSCOPY | Facility: HOSPITAL | Age: 48
End: 2023-01-24

## 2023-01-24 ENCOUNTER — CLINICAL SUPPORT (OUTPATIENT)
Dept: ENDOSCOPY | Facility: HOSPITAL | Age: 48
End: 2023-01-24
Attending: INTERNAL MEDICINE
Payer: COMMERCIAL

## 2023-01-24 VITALS — BODY MASS INDEX: 27.4 KG/M2 | WEIGHT: 185 LBS | HEIGHT: 69 IN

## 2023-01-24 DIAGNOSIS — Z12.11 ENCOUNTER FOR SCREENING COLONOSCOPY: ICD-10-CM

## 2023-01-24 RX ORDER — POLYETHYLENE GLYCOL 3350, SODIUM SULFATE ANHYDROUS, SODIUM BICARBONATE, SODIUM CHLORIDE, POTASSIUM CHLORIDE 236; 22.74; 6.74; 5.86; 2.97 G/4L; G/4L; G/4L; G/4L; G/4L
4 POWDER, FOR SOLUTION ORAL ONCE
Qty: 4000 ML | Refills: 0 | Status: SHIPPED | OUTPATIENT
Start: 2023-01-24 | End: 2023-01-24

## 2023-03-09 ENCOUNTER — ANESTHESIA EVENT (OUTPATIENT)
Dept: ENDOSCOPY | Facility: HOSPITAL | Age: 48
End: 2023-03-09
Payer: COMMERCIAL

## 2023-03-09 RX ORDER — SODIUM CHLORIDE, SODIUM LACTATE, POTASSIUM CHLORIDE, CALCIUM CHLORIDE 600; 310; 30; 20 MG/100ML; MG/100ML; MG/100ML; MG/100ML
INJECTION, SOLUTION INTRAVENOUS CONTINUOUS
Status: CANCELLED | OUTPATIENT
Start: 2023-03-09

## 2023-03-09 RX ORDER — LIDOCAINE HYDROCHLORIDE 10 MG/ML
1 INJECTION, SOLUTION EPIDURAL; INFILTRATION; INTRACAUDAL; PERINEURAL ONCE
Status: CANCELLED | OUTPATIENT
Start: 2023-03-09 | End: 2023-03-09

## 2023-03-10 ENCOUNTER — ANESTHESIA (OUTPATIENT)
Dept: ENDOSCOPY | Facility: HOSPITAL | Age: 48
End: 2023-03-10
Payer: COMMERCIAL

## 2023-03-10 ENCOUNTER — HOSPITAL ENCOUNTER (OUTPATIENT)
Facility: HOSPITAL | Age: 48
Discharge: HOME | End: 2023-03-10
Attending: INTERNAL MEDICINE | Admitting: INTERNAL MEDICINE
Payer: COMMERCIAL

## 2023-03-10 VITALS
RESPIRATION RATE: 20 BRPM | TEMPERATURE: 98 F | SYSTOLIC BLOOD PRESSURE: 111 MMHG | DIASTOLIC BLOOD PRESSURE: 58 MMHG | OXYGEN SATURATION: 100 % | HEART RATE: 50 BPM

## 2023-03-10 DIAGNOSIS — Z13.9 ENCOUNTER FOR SCREENING: ICD-10-CM

## 2023-03-10 PROCEDURE — 45378 PR COLONOSCOPY,DIAGNOSTIC: ICD-10-PCS | Mod: ,,, | Performed by: INTERNAL MEDICINE

## 2023-03-10 PROCEDURE — 37000008 HC ANESTHESIA 1ST 15 MINUTES: Performed by: INTERNAL MEDICINE

## 2023-03-10 PROCEDURE — D9220A PRA ANESTHESIA: ICD-10-PCS | Mod: ANES,,, | Performed by: ANESTHESIOLOGY

## 2023-03-10 PROCEDURE — 37000009 HC ANESTHESIA EA ADD 15 MINS: Performed by: INTERNAL MEDICINE

## 2023-03-10 PROCEDURE — 63600175 PHARM REV CODE 636 W HCPCS: Performed by: NURSE ANESTHETIST, CERTIFIED REGISTERED

## 2023-03-10 PROCEDURE — D9220A PRA ANESTHESIA: Mod: CRNA,,, | Performed by: NURSE ANESTHETIST, CERTIFIED REGISTERED

## 2023-03-10 PROCEDURE — 45378 DIAGNOSTIC COLONOSCOPY: CPT | Performed by: INTERNAL MEDICINE

## 2023-03-10 PROCEDURE — 25000003 PHARM REV CODE 250: Performed by: NURSE ANESTHETIST, CERTIFIED REGISTERED

## 2023-03-10 PROCEDURE — D9220A PRA ANESTHESIA: Mod: ANES,,, | Performed by: ANESTHESIOLOGY

## 2023-03-10 PROCEDURE — 45378 DIAGNOSTIC COLONOSCOPY: CPT | Mod: ,,, | Performed by: INTERNAL MEDICINE

## 2023-03-10 PROCEDURE — D9220A PRA ANESTHESIA: ICD-10-PCS | Mod: CRNA,,, | Performed by: NURSE ANESTHETIST, CERTIFIED REGISTERED

## 2023-03-10 RX ORDER — PROPOFOL 10 MG/ML
VIAL (ML) INTRAVENOUS
Status: DISCONTINUED | OUTPATIENT
Start: 2023-03-10 | End: 2023-03-10

## 2023-03-10 RX ORDER — PROPOFOL 10 MG/ML
INJECTION, EMULSION INTRAVENOUS
Status: DISCONTINUED
Start: 2023-03-10 | End: 2023-03-10 | Stop reason: HOSPADM

## 2023-03-10 RX ORDER — SODIUM CHLORIDE 9 MG/ML
INJECTION, SOLUTION INTRAVENOUS CONTINUOUS
Status: DISCONTINUED | OUTPATIENT
Start: 2023-03-10 | End: 2023-03-10 | Stop reason: HOSPADM

## 2023-03-10 RX ORDER — LIDOCAINE HYDROCHLORIDE 20 MG/ML
INJECTION, SOLUTION EPIDURAL; INFILTRATION; INTRACAUDAL; PERINEURAL
Status: DISCONTINUED
Start: 2023-03-10 | End: 2023-03-10 | Stop reason: HOSPADM

## 2023-03-10 RX ORDER — LIDOCAINE HYDROCHLORIDE 20 MG/ML
INJECTION INTRAVENOUS
Status: DISCONTINUED | OUTPATIENT
Start: 2023-03-10 | End: 2023-03-10

## 2023-03-10 RX ADMIN — SODIUM CHLORIDE: 0.9 INJECTION, SOLUTION INTRAVENOUS at 10:03

## 2023-03-10 RX ADMIN — PROPOFOL 20 MG: 10 INJECTION, EMULSION INTRAVENOUS at 10:03

## 2023-03-10 RX ADMIN — PROPOFOL 100 MG: 10 INJECTION, EMULSION INTRAVENOUS at 10:03

## 2023-03-10 RX ADMIN — LIDOCAINE HYDROCHLORIDE 100 MG: 20 INJECTION, SOLUTION INTRAVENOUS at 10:03

## 2023-03-10 NOTE — TRANSFER OF CARE
Anesthesia Transfer of Care Note    Patient: Paul Frank    Procedure(s) Performed: Procedure(s) (LRB):  COLONOSCOPY (N/A)    Patient location: GI    Anesthesia Type: general    Transport from OR: Transported from OR on room air with adequate spontaneous ventilation    Post pain: adequate analgesia    Post assessment: no apparent anesthetic complications and tolerated procedure well    Post vital signs: stable    Level of consciousness: lethargic and responds to stimulation    Nausea/Vomiting: no nausea/vomiting    Complications: none    Transfer of care protocol was followed      Last vitals:   Visit Vitals  /66 (BP Location: Left arm, Patient Position: Lying)   Pulse 72   Temp 36.6 °C (97.9 °F) (Oral)   Resp 15   SpO2 99%

## 2023-03-10 NOTE — PROVATION PATIENT INSTRUCTIONS
Discharge Summary/Instructions after an Endoscopic Procedure  Patient Name: Paul Frank  Patient MRN: [id number]  Patient YOB: 1975  Friday, March 10, 2023  Mert Pitt MD  Dear patient,  As a result of recent federal legislation (The Federal Cures Act), you may   receive lab or pathology results from your procedure in your [facility]   account before your physician is able to contact you. Your physician or   their representative will relay the results to you with their   recommendations at their soonest availability.  Thank you,  RESTRICTIONS:  During your procedure today, you received medications for sedation.  These   medications may affect your judgment, balance and coordination.  Therefore,   for 24 hours, you have the following restrictions:   - DO NOT drive a car, operate machinery, make legal/financial decisions,   sign important papers or drink alcohol.    ACTIVITY:  Today: no heavy lifting, straining or running due to procedural   sedation/anesthesia.  The following day: return to full activity including work.  DIET:  Eat and drink normally unless instructed otherwise.     TREATMENT FOR COMMON SIDE EFFECTS:  - Mild abdominal pain, nausea, belching, bloating or excessive gas:  rest,   eat lightly and use a heating pad.  - Sore Throat: treat with throat lozenges and/or gargle with warm salt   water.  - Because air was used during the procedure, expelling large amounts of air   from your rectum or belching is normal.  - If a bowel prep was taken, you may not have a bowel movement for 1-3 days.    This is normal.  SYMPTOMS TO WATCH FOR AND REPORT TO YOUR PHYSICIAN:  1. Abdominal pain or bloating, other than gas cramps.  2. Chest pain.  3. Back pain.  4. Signs of infection such as: chills or fever occurring within 24 hours   after the procedure.  5. Rectal bleeding, which would show as bright red, maroon, or black stools.   (A tablespoon of blood from the rectum is not serious, especially if    hemorrhoids are present.)  6. Vomiting.  7. Weakness or dizziness.  GO DIRECTLY TO THE NEAREST EMERGENCY ROOM IF YOU HAVE ANY OF THE FOLLOWING:      Difficulty breathing              Chills and/or fever over 101 F   Persistent vomiting and/or vomiting blood   Severe abdominal pain   Severe chest pain   Black, tarry stools   Bleeding- more than one tablespoon   Any other symptom or condition that you feel may need urgent attention  Your doctor recommends these additional instructions:  If any biopsies were taken, your doctors clinic will contact you in 1 to 2   weeks with any results.  - Discharge patient to home.   - Resume previous diet.   - Continue present medications.   - Repeat colonoscopy in 10 years for screening purposes.  For questions, problems or results please call your physician - Mert Pitt MD at Work:  [phone].  [facility] can be reached at [phone]     IF A COMPLICATION OR EMERGENCY SITUATION ARISES AND YOU ARE UNABLE TO REACH   YOUR PHYSICIAN - GO DIRECTLY TO THE EMERGENCY ROOM.  Mert Pitt MD  3/10/2023 10:54:32 AM  This report has been verified and signed electronically.  Dear patient,  As a result of recent federal legislation (The Federal Cures Act), you may   receive lab or pathology results from your procedure in your [facility]   account before your physician is able to contact you. Your physician or   their representative will relay the results to you with their   recommendations at their soonest availability.  Thank you,  PROVATION

## 2023-03-10 NOTE — PLAN OF CARE
Procedure and recovery complete. Pt awake and alert. MD at bedside, procedure findings and suggestions discussed. Dischsrge instructions given, pt verbalized understanding of instructions. Gait steady able to ambulate without assistance. Awaiting wife to arrive.

## 2023-03-10 NOTE — ANESTHESIA PREPROCEDURE EVALUATION
03/10/2023    Pre-operative evaluation for Procedure(s) (LRB):  COLONOSCOPY (N/A)    Paul Frank is a 47 y.o. male     Patient Active Problem List   Diagnosis    Degenerative disc disease, cervical    Overweight (BMI 25.0-29.9)    Dyslipidemia       Review of patient's allergies indicates:  No Known Allergies    No current facility-administered medications on file prior to encounter.     No current outpatient medications on file prior to encounter.       Past Surgical History:   Procedure Laterality Date    COLONOSCOPY N/A 12/5/2022    Procedure: COLONOSCOPY;  Surgeon: Nicolle Salcedo MD;  Location: [facility];  Service: Endoscopy;  Laterality: N/A;  instructions portal-LW    FOOT FRACTURE SURGERY         Social History     Socioeconomic History    Marital status:      Spouse name: john    Number of children: 2   Occupational History    Occupation:      Employer: [employer]   Tobacco Use    Smoking status: Never    Smokeless tobacco: Never   Substance and Sexual Activity    Alcohol use: Yes     Alcohol/week: 0.0 standard drinks     Types: 1 - 2 Cans of beer per week     Comment: socially    Drug use: No    Sexual activity: Yes     Partners: Female   Social History Narrative    He exercises regularly.    He is a  - [employer]     Social Determinants of Health     Financial Resource Strain: Low Risk     Difficulty of Paying Living Expenses: Not hard at all   Food Insecurity: No Food Insecurity    Worried About Running Out of Food in the Last Year: Never true    Ran Out of Food in the Last Year: Never true   Transportation Needs: No Transportation Needs    Lack of Transportation (Medical): No    Lack of Transportation (Non-Medical): No   Physical Activity: Sufficiently Active    Days of Exercise per Week: 5 days    Minutes of Exercise per Session: 90 min   Stress: No Stress Concern  Present    Feeling of Stress : Not at all   Social Connections: Unknown    Frequency of Communication with Friends and Family: Twice a week    Frequency of Social Gatherings with Friends and Family: Once a week    Active Member of Clubs or Organizations: Yes    Attends Club or Organization Meetings: More than 4 times per year    Marital Status:    Housing Stability: Low Risk     Unable to Pay for Housing in the Last Year: No    Number of Places Lived in the Last Year: 1    Unstable Housing in the Last Year: No         CBC: No results for input(s): WBC, RBC, HGB, HCT, PLT, MCV, MCH, MCHC in the last 72 hours.    CMP: No results for input(s): NA, K, CL, CO2, BUN, CREATININE, GLU, MG, PHOS, CALCIUM, ALBUMIN, PROT, ALKPHOS, ALT, AST, BILITOT in the last 72 hours.    INR  No results for input(s): PT, INR, PROTIME, APTT in the last 72 hours.        Diagnostic Studies:      EKD Echo:  No results found for this or any previous visit.        Pre-op Assessment    I have reviewed the Patient Summary Reports.    I have reviewed the NPO Status.   I have reviewed the Medications.     Review of Systems  Anesthesia Hx:  History of prior surgery of interest to airway management or planning: Denies Family Hx of Anesthesia complications.   Denies Personal Hx of Anesthesia complications.   Social:  Non-Smoker    Hematology/Oncology:     Oncology Normal     EENT/Dental:EENT/Dental Normal   Cardiovascular:  Cardiovascular Normal     Pulmonary:  Pulmonary Normal    Renal/:  Renal/ Normal     Hepatic/GI:  Hepatic/GI Normal    Neurological:  Neurology Normal    Endocrine:  Endocrine Normal    Psych:  Psychiatric Normal           Physical Exam  General: Cooperative, Well nourished, Alert and Oriented    Airway:  Mallampati: I   Mouth Opening: Normal  TM Distance: Normal  Tongue: Normal  Neck ROM: Normal ROM    Dental:  Intact    Chest/Lungs:  Normal Respiratory Rate    Heart:  Rate: Normal  Rhythm: Regular  Rhythm        Anesthesia Plan  Type of Anesthesia, risks & benefits discussed:    Anesthesia Type: Gen Natural Airway  Intra-op Monitoring Plan: Standard ASA Monitors  Induction:  IV  Informed Consent: Informed consent signed with the Patient and all parties understand the risks and agree with anesthesia plan.  All questions answered.   ASA Score: 1    Ready For Surgery From Anesthesia Perspective.     .

## 2023-03-10 NOTE — ANESTHESIA POSTPROCEDURE EVALUATION
Anesthesia Post Evaluation    Patient: Paul Frank    Procedure(s) Performed: Procedure(s) (LRB):  COLONOSCOPY (N/A)    Final Anesthesia Type: general      Patient location during evaluation: GI PACU  Patient participation: Yes- Able to Participate  Level of consciousness: awake and alert and oriented  Post-procedure vital signs: reviewed and stable  Pain management: adequate  Airway patency: patent    PONV status at discharge: No PONV  Anesthetic complications: no      Cardiovascular status: blood pressure returned to baseline and hemodynamically stable  Respiratory status: unassisted, spontaneous ventilation and room air  Hydration status: euvolemic  Follow-up not needed.          Vitals Value Taken Time   /58 03/10/23 1115   Temp 36.6 °C (97.9 °F) 03/10/23 1045   Pulse 50 03/10/23 1115   Resp 20 03/10/23 1115   SpO2 100 % 03/10/23 1115         Event Time   Out of Recovery 11:35:00         Pain/Maikol Score: Maikol Score: 10 (3/10/2023 11:15 AM)

## 2023-09-11 ENCOUNTER — OFFICE VISIT (OUTPATIENT)
Dept: FAMILY MEDICINE | Facility: CLINIC | Age: 48
End: 2023-09-11
Payer: COMMERCIAL

## 2023-09-11 ENCOUNTER — HOSPITAL ENCOUNTER (OUTPATIENT)
Dept: RADIOLOGY | Facility: HOSPITAL | Age: 48
Discharge: HOME | End: 2023-09-11
Attending: INTERNAL MEDICINE
Payer: COMMERCIAL

## 2023-09-11 ENCOUNTER — PATIENT OUTREACH (OUTPATIENT)
Dept: ADMINISTRATIVE | Facility: HOSPITAL | Age: 48
End: 2023-09-11
Payer: COMMERCIAL

## 2023-09-11 VITALS
BODY MASS INDEX: 27.53 KG/M2 | HEART RATE: 55 BPM | TEMPERATURE: 98 F | HEIGHT: 69 IN | DIASTOLIC BLOOD PRESSURE: 80 MMHG | OXYGEN SATURATION: 97 % | WEIGHT: 185.88 LBS | SYSTOLIC BLOOD PRESSURE: 138 MMHG

## 2023-09-11 DIAGNOSIS — G89.29 CHRONIC FOOT PAIN, LEFT: ICD-10-CM

## 2023-09-11 DIAGNOSIS — Z00.00 ROUTINE ADULT HEALTH MAINTENANCE: Primary | ICD-10-CM

## 2023-09-11 DIAGNOSIS — E66.3 OVERWEIGHT (BMI 25.0-29.9): ICD-10-CM

## 2023-09-11 DIAGNOSIS — G89.29 CHRONIC RIGHT-SIDED LOW BACK PAIN, UNSPECIFIED WHETHER SCIATICA PRESENT: ICD-10-CM

## 2023-09-11 DIAGNOSIS — M79.672 CHRONIC FOOT PAIN, LEFT: ICD-10-CM

## 2023-09-11 DIAGNOSIS — E78.5 DYSLIPIDEMIA: ICD-10-CM

## 2023-09-11 DIAGNOSIS — M54.50 CHRONIC RIGHT-SIDED LOW BACK PAIN, UNSPECIFIED WHETHER SCIATICA PRESENT: ICD-10-CM

## 2023-09-11 DIAGNOSIS — Z12.5 ENCOUNTER FOR PROSTATE CANCER SCREENING: ICD-10-CM

## 2023-09-11 DIAGNOSIS — E66.3 OVERWEIGHT (BMI 25.0-29.9): Primary | ICD-10-CM

## 2023-09-11 PROCEDURE — 99999 PR PBB SHADOW E&M-EST. PATIENT-LVL IV: ICD-10-PCS | Mod: PBBFAC,,, | Performed by: INTERNAL MEDICINE

## 2023-09-11 PROCEDURE — 99396 PREV VISIT EST AGE 40-64: CPT | Mod: S$GLB,,, | Performed by: INTERNAL MEDICINE

## 2023-09-11 PROCEDURE — 99214 PR OFFICE/OUTPT VISIT, EST, LEVL IV, 30-39 MIN: ICD-10-PCS | Mod: 25,S$GLB,, | Performed by: INTERNAL MEDICINE

## 2023-09-11 PROCEDURE — 99999 PR PBB SHADOW E&M-EST. PATIENT-LVL IV: CPT | Mod: PBBFAC,,, | Performed by: INTERNAL MEDICINE

## 2023-09-11 PROCEDURE — 73630 X-RAY EXAM OF FOOT: CPT | Mod: 26,LT,, | Performed by: INTERNAL MEDICINE

## 2023-09-11 PROCEDURE — 99396 PR PREVENTIVE VISIT,EST,40-64: ICD-10-PCS | Mod: S$GLB,,, | Performed by: INTERNAL MEDICINE

## 2023-09-11 PROCEDURE — 73630 XR FOOT COMPLETE 3 VIEW LEFT: ICD-10-PCS | Mod: 26,LT,, | Performed by: INTERNAL MEDICINE

## 2023-09-11 PROCEDURE — 73630 X-RAY EXAM OF FOOT: CPT | Mod: TC,FY,PO,LT

## 2023-09-11 PROCEDURE — 99214 OFFICE O/P EST MOD 30 MIN: CPT | Mod: 25,S$GLB,, | Performed by: INTERNAL MEDICINE

## 2023-09-11 NOTE — PATIENT INSTRUCTIONS
[facility] ([phone]) is a comprehensive treatment approach for individuals with chronic or recurrent neck or back pain. Treatment focuses on active recovery, restoring spinal motion and strength, and ultimately getting people back to the activities they enjoy. While receiving care, patients work with an integrated healthcare team including a Physician or Advanced Practice Provider, Physical Therapists, and Health and Wellness Coaches. Patients also engage in an active physical therapy program focused on pain reduction and return to function, then transition to a Wellness Program for general strength maintenance.    What you should expect    A thorough evaluation with one of our skilled Physical Therapists to identify musculoskeletal impairments and movement dysfunctions  Strength testing of the cervical and/or lumbar extensor muscles using medical machines that isolate the targeted muscle groups. These results will provide a baseline measure of your back or neck strength, help determine an appropriate starting exercise weight, and allow for objective measurement of your progress as you go through treatment.   An active, evidenced-based, research-driven Physical Therapy program focused on neck and/or back pain reduction, targeted strengthening of the spinal and surrounding musculature, and education to promote understanding, prevention, and management of your pain.  Regular meetings with a Health  who will assist you with achieving your health and fitness goals  Enrollment into a wellness program after successful completion of Physical Therapy. Here you will work with a team of Wellness Coaches to help you maintain the strength and functional movement gains you've achieved with Physical Therapy.    Integrated Therapy Team - Your therapy team consists of your Physician, your physical therapists, health coaches, and wellness coaches.  Your team will work together to provide you  with comprehensive care and ensure you are successful in the program. Physical Therapy Evaluation and Measurements- You will be seen by a Physical Therapist for an evaluation.  This evaluation will include a mechanical evaluation to determine the best strategies for managing your symptoms. Your Physical Therapist will also conduct a strength assessment on the medical exercise machines, to determine where your spine strength and motion issues are.   Health Coaching-The health  can assist you with areas you seek guidance in such as nutrition and diet, goal setting, stress management, and deep breathing/relation techniques.  You can meet with the health  face to face or by phone.  Please speak with your therapist if you would like to utilize this valuable service. Clinical Stretches and Exercise - Most neck and back pain gets better or worse depending on the movement or position you are in.  Your physical therapy team will evaluate you and determine the stretches you should perform to control your neck and back pain on your own.   Specialized Therapy Equipment - The equipment protocols used in the program are supported by over 25 years of medical research.  As your spinal strength increases, you will feel better and be able to perform more activities. General Strengthening - It is not just your neck or back that walk around.  Strengthening the muscles in your upper and lower body will   allow you to be more active and enhance your overall health and well-being.   Weekly Visits - Patients are encouraged to attend 2 times a week for an average of 10 weeks, under the direction of a Physical Therapist.  Consistently attending weekly visits allows you to get progressively stronger and get the most out of your program. Wellness Program - After completing your visits in the program with a Physical Therapist, you can participate in our supervised wellness program.  Our wellness program is staffed with [facility]  coaches and is available for you when you complete your therapy.       Scientifically-[employer]    Through progressive resistance strengthening and the principles of adaptive overload, patients suffering from chronic back and neck pain experience improved muscle strength, disc health, increased connective tissue thickness, endurance, mobility, and function over the duration of their treatment. As musculoskeletal function improves, symptoms diminish and their psychological outlook improves.    Over the last 25 years, the [facility], the [facility], and medical institutions around the world have published compelling research showing that specific spinal strengthening using the FDA-registered MedX Lumbar and Cervical Extension machines improves patient outcomes even after multiple failed attempts at other forms of treatment. With more than 75 published articles in peer-reviewed journals, the proof lies in both the research and patient testimonials.    How to Enroll    Give us a call at [phone], our Central Scheduling number, to schedule an initial appointment with our Physicians/APPs or Physical Therapists who will complete a thorough examination to determine if you may benefit from this treatment approach.
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
## 2023-09-12 ENCOUNTER — LAB VISIT (OUTPATIENT)
Dept: LAB | Facility: HOSPITAL | Age: 48
End: 2023-09-12
Attending: INTERNAL MEDICINE
Payer: COMMERCIAL

## 2023-09-12 DIAGNOSIS — E66.3 OVERWEIGHT (BMI 25.0-29.9): ICD-10-CM

## 2023-09-12 DIAGNOSIS — Z00.00 ROUTINE ADULT HEALTH MAINTENANCE: ICD-10-CM

## 2023-09-12 DIAGNOSIS — E78.5 DYSLIPIDEMIA: ICD-10-CM

## 2023-09-12 DIAGNOSIS — Z12.5 ENCOUNTER FOR PROSTATE CANCER SCREENING: ICD-10-CM

## 2023-09-12 LAB
ALBUMIN SERPL BCP-MCNC: 4.2 G/DL (ref 3.5–5.2)
ALP SERPL-CCNC: 83 U/L (ref 55–135)
ALT SERPL W/O P-5'-P-CCNC: 21 U/L (ref 10–44)
ANION GAP SERPL CALC-SCNC: 12 MMOL/L (ref 8–16)
AST SERPL-CCNC: 21 U/L (ref 10–40)
BILIRUB SERPL-MCNC: 0.7 MG/DL (ref 0.1–1)
BUN SERPL-MCNC: 12 MG/DL (ref 6–20)
CALCIUM SERPL-MCNC: 9.9 MG/DL (ref 8.7–10.5)
CHLORIDE SERPL-SCNC: 105 MMOL/L (ref 95–110)
CHOLEST SERPL-MCNC: 209 MG/DL (ref 120–199)
CHOLEST/HDLC SERPL: 3.9 {RATIO} (ref 2–5)
CO2 SERPL-SCNC: 22 MMOL/L (ref 23–29)
COMPLEXED PSA SERPL-MCNC: 0.73 NG/ML (ref 0–4)
CREAT SERPL-MCNC: 0.9 MG/DL (ref 0.5–1.4)
EST. GFR  (NO RACE VARIABLE): >60 ML/MIN/1.73 M^2
ESTIMATED AVG GLUCOSE: 108 MG/DL (ref 68–131)
GLUCOSE SERPL-MCNC: 79 MG/DL (ref 70–110)
HBA1C MFR BLD: 5.4 % (ref 4–5.6)
HDLC SERPL-MCNC: 54 MG/DL (ref 40–75)
HDLC SERPL: 25.8 % (ref 20–50)
LDLC SERPL CALC-MCNC: 143.2 MG/DL (ref 63–159)
NONHDLC SERPL-MCNC: 155 MG/DL
POTASSIUM SERPL-SCNC: 4.4 MMOL/L (ref 3.5–5.1)
PROT SERPL-MCNC: 7.2 G/DL (ref 6–8.4)
SODIUM SERPL-SCNC: 139 MMOL/L (ref 136–145)
TRIGL SERPL-MCNC: 59 MG/DL (ref 30–150)

## 2023-09-12 PROCEDURE — 84153 ASSAY OF PSA TOTAL: CPT | Performed by: INTERNAL MEDICINE

## 2023-09-12 PROCEDURE — 83695 ASSAY OF LIPOPROTEIN(A): CPT | Performed by: INTERNAL MEDICINE

## 2023-09-12 PROCEDURE — 83036 HEMOGLOBIN GLYCOSYLATED A1C: CPT | Performed by: INTERNAL MEDICINE

## 2023-09-12 PROCEDURE — 80053 COMPREHEN METABOLIC PANEL: CPT | Performed by: INTERNAL MEDICINE

## 2023-09-12 PROCEDURE — 80061 LIPID PANEL: CPT | Performed by: INTERNAL MEDICINE

## 2023-09-12 PROCEDURE — 36415 COLL VENOUS BLD VENIPUNCTURE: CPT | Mod: PO | Performed by: INTERNAL MEDICINE

## 2023-09-12 PROCEDURE — 82172 ASSAY OF APOLIPOPROTEIN: CPT | Performed by: INTERNAL MEDICINE

## 2023-09-14 LAB — APO B SERPL-MCNC: 105 MG/DL

## 2023-09-15 DIAGNOSIS — E78.5 DYSLIPIDEMIA: Primary | ICD-10-CM

## 2023-09-15 LAB — LPA SERPL-MCNC: 207 MG/DL (ref 0–30)

## 2023-09-15 RX ORDER — ROSUVASTATIN CALCIUM 10 MG/1
10 TABLET, COATED ORAL DAILY
Qty: 90 EACH | Refills: 3 | Status: SHIPPED | OUTPATIENT
Start: 2023-09-15 | End: 2024-09-14

## 2023-09-16 NOTE — PROGRESS NOTES
High risk of cardiovascular disease indicated by high ApoB/Lp(a) levels.   Needs cholesterol-lowering therapy for primary prevention of ASCVD  Sent [facility] message discussing results  Start rosuvastatin   Recheck lipids in 6 months

## 2023-12-20 ENCOUNTER — CLINICAL SUPPORT (OUTPATIENT)
Dept: REHABILITATION | Facility: OTHER | Age: 48
End: 2023-12-20
Attending: INTERNAL MEDICINE
Payer: COMMERCIAL

## 2023-12-20 DIAGNOSIS — R29.898 DECREASED STRENGTH OF TRUNK AND BACK: ICD-10-CM

## 2023-12-20 DIAGNOSIS — G89.29 CHRONIC RIGHT-SIDED LOW BACK PAIN, UNSPECIFIED WHETHER SCIATICA PRESENT: ICD-10-CM

## 2023-12-20 DIAGNOSIS — M54.50 CHRONIC RIGHT-SIDED LOW BACK PAIN, UNSPECIFIED WHETHER SCIATICA PRESENT: ICD-10-CM

## 2023-12-20 DIAGNOSIS — R29.3 POOR POSTURE: Primary | ICD-10-CM

## 2023-12-20 DIAGNOSIS — M25.69 DECREASED RANGE OF MOTION OF TRUNK AND BACK: ICD-10-CM

## 2023-12-20 PROCEDURE — 97530 THERAPEUTIC ACTIVITIES: CPT

## 2023-12-20 PROCEDURE — 97161 PT EVAL LOW COMPLEX 20 MIN: CPT

## 2023-12-20 NOTE — PLAN OF CARE
[facility]  Physical Therapy Lumbar Evaluation      Name: Paul Frank  Clinic Number: [id number]    Therapy Diagnosis:   Encounter Diagnoses   Name Primary?    Chronic right-sided low back pain, unspecified whether sciatica present     Poor posture Yes    Decreased range of motion of trunk and back     Decreased strength of trunk and back      Physician: Que Terrell MD    Physician Orders: PT Eval and Treat  Medical Diagnosis from Referral: M54.50,G89.29 (ICD-10-CM) - Chronic right-sided low back pain, unspecified whether sciatica present   Evaluation Date: 12/20/2023  Authorization Period Expiration: 9/10/24  Plan of Care Expiration: 2/28/2024  Reassessment Due: 1/20/2024  Visit # / Visits authorized: 1/20  MedX testing visit 2    Time In: 1000  Time Out: 1100  Total Billable Time: 60 minutes  INSURANCE and OUTCOMES: Fee for Service with FOTO Outcomes 1/3    Precautions: standard    Pattern of pain determined: Pattern 1     Subjective     Date of onset: many years ago  History of current condition: Paul reports that he first started having back pain when he was in the  doing PT. He had been able to manage until the past 5-6 years. At this time he has trouble doing certain tasks like washing dog, bending forward, riding in the car. He also endorses pain while sleeping. No mechanism of injury noted. He feels the majority of his pain in his midline lumbar spine.      Medical History:   Past Medical History:   Diagnosis Date    Abnormal liver enzymes        Surgical History:   Paul Frank  has a past surgical history that includes Foot fracture surgery; Colonoscopy (N/A, 12/5/2022); and Colonoscopy (N/A, 3/10/2023).    Medications:   Paul has a current medication list which includes the following prescription(s): rosuvastatin.    Allergies:   Review of patient's allergies indicates:  No Known Allergies     Imaging: No relevant imaging on file     Prior  "Therapy: PT in   Prior Treatment: none for spine  Social History: good support system  Occupation: sits at a desk, works for [employer]  Leisure: rowing      Prior Level of Function: independent  Current Level of Function: independent with increased time   DME owned/used: no  Gym Membership: yes     Pain:  Current 0/10, worst 6/10, best 0/10   Location: midline lumbar spine   Description: sustained, dull, achy  Aggravating Factors: bending, riding car, prolonged time leaning forward   Easing Factors: standing  Disturbed Sleep: yes     Pattern of pain questions:  1.  Where is your pain the worst? back  2.  Is your pain constant or intermittent? Intermittent   3.  Does bending forward make your typical pain worse? Yes   4.  Since the start of your back pain, has there been a change in your bowel or bladder? no  5.  What can't you do now that you use to be able to do? Wash dog, bend forward for long periods of time     Pts goals: "To learn some techniques to help improve pain"    Red Flag Screening:   Cough/Sneeze Strain: (--)  Bladder/Bowel: (--)  Falls: (--)  Night pain: (--)  Unexplained weight loss: (--)  General health: good     Objective      Postural examination/scapula alignment: Rounded shoulder  Joint integrity: Firm end feeling  Skin integrity:WNL   Edema: None  Correction of posture: better with lumbar roll      MOVEMENT LOSS - Lumbar   Norms ROM Loss Initial   Flexion Fingers touch toes, sacral angle >/= 70 deg, uniform spinal curvature, posterior weight shift  minimal loss   Extension ASIS surpasses toes, spine of scapulae surpasses heels, uniform spinal curve minimal loss   Side glide Right  minimal loss   Side glide Left  minimal loss   Rotation Right PT observes contralateral shoulder minimal loss   Rotation Left PT observes contralateral shoulder minimal loss     Lower Extremity Strength  Right LE  Left LE    Hip flexion: 5/5 Hip flexion: 5/5   Hip extension:  5/5 Hip extension: 5/5   Hip " abduction: 5/5 Hip abduction: 5/5   Hip adduction:  5/5 Hip adduction:  5/5   Hip External Rotation 5/5 Hip External Rotation 5/5   Hip Internal rotation   5/5 Hip Internal rotation 5/5   Knee Flexion 5/5 Knee Flexion 5/5   Knee Extension 5/5 Knee Extension 5/5   Ankle dorsiflexion: 5/5 Ankle dorsiflexion: 5/5   Ankle plantarflexion: 5/5 Ankle plantarflexion: 5/5     GAIT:  Assistive Device used: none  Level of Assistance: independent  Patient displays the following gait deviations: no gait deviations observed.     Special Tests:   Test Name  Test Result   Prone Instability Test (--)   SI Joint Provocation Test (--)   Straight Leg Raise (--)   Neural Tension Test (--)   Crossed Straight Leg Raise (--)   Walking on toes Able   Walking on heels  Able     NEUROLOGICAL SCREENING:     Sensory deficits: WFL    Reflexes:    Left Right   Patella Tendon 1+ 1+   Achilles Tendon 1+ 1+   Clonus (--) (--)     REPEATED TEST MOVEMENTS:    Baseline symptoms:  Repeated Flexion in Standing worse  produced   Repeated Extension in Standing worse   Repeated Flexion in lying worse   Repeated Extension in lying  worse       STATIC TESTS and other movements:   Prone lie worse   Prone lie on elbows worse   Sitting slouched  worse   Sitting erect better   Standing slouched worse   Standing erect  better     Lumbar testing Visit 2    OUTCOMES SELECTION:   Intake Outcome Measure for FOTO Lumbar Survey    Therapist reviewed FOTO scores for Paul Frank on 12/20/2023.   FOTO documents entered into [employer] - see Media section.    Intake Score: 67% functional ability  Goal Score: 76% functional ability          Treatment     Total Treatment time separate from Evaluation: 10 minutes    Written Home Exercises Provided: yes.    HEP AS FOLLOWS:  LTR  PPT  SOC  DKTC    Exercises were reviewed and Paul was able to demonstrate them prior to the end of the session. Paul demonstrated good  understanding of the education provided.     See EMR under  Patient Instructions for exercises provided 12/20/2023.    MedX Testing:  MedX testing to be performed next visit    Therapeutic Education/Activity provided for 10 minutes:   - Patient was given an [facility] Visit 1 handout which discusses the following:  - what to expect in therapy  - an overview of the program, including health coaching and wellness  - importance of spinal hygiene, proper posture, lifting mechanics, sleep quality, and nutrition/hydration   - Kelechi roll trialed, recommended, and purchase information was provided.  - Patient received a handout regarding anticipated muscular soreness following the isometric test and strategies for management were reviewed with patient including stretching, using ice and scheduled rest.   - Patient received verbal education on the following:   - Healthy Back program   - purpose of the isometric test  - safe progression of lumbar strengthening, wellness approach, and systemic strengthening.   - safe usage of MedX machine and testing protocols.      Assessment     Paul is a 48 y.o. male referred to [facility] with a medical diagnosis of LBP. Patient presents with reported back dominant pain that is reproduced with all movements and reduced with rest indicating directional preference of unclear. Upon physical assessment, patient demonstrates slouched posturing in sitting, mild hip weakness bilaterally, and trunk/hip mobility deficits. Also noted that hip, lumbar, and thoracic rotation were all limited significantly. Per lumbar MedX testing, pt was - below average in strength when compared to those of same age/height/weight/gender. All of the above supports potential lumbar classification as a pattern 1 PEN with recurrent and/or consistent symptoms. Patient is a good candidate for the Healthy Back Program. Pt would benefit from LE and trunk mobility training, stability training, improved cardiovascular and muscular endurance, neuromuscular  re-education for posture, coordination, and muscular recruitment and education on positional offloading techniques to decrease the intensity and frequency of flare-ups.      Pain Pattern: 1 PEN       Pt prognosis is Good.     Pt will benefit from skilled outpatient Physical Therapy to address the deficits stated above and in the chart below, to provide pt/family education, and to maximize pt's level of independence. Based on the above history and physical examination an active physical therapy program is recommended.      Plan of care discussed with patient: Yes  Pt's spiritual, cultural and educational needs considered and patient is agreeable to the plan of care and goals as stated below:     Anticipated Barriers for therapy: none     PT Evaluation Completed? Yes    Medical necessity is demonstrated by the following problem list:    History  Co-morbidities and personal factors that may impact the plan of care [] LOW: no personal factors / co-morbidities  [] MODERATE: 1-2 personal factors / co-morbidities  [] HIGH: 3+ personal factors / co-morbidities    Moderate / High Support Documentation:   Co-morbidities affecting plan of care: -    Personal Factors:   None      Examination  Body Structures and Functions, activity limitations and participation restrictions that may impact the plan of care [] LOW: addressing 1-2 elements  [] MODERATE: 3+ elements  [] HIGH: 4+ elements (please support below)    Moderate / High Support Documentation:     Clinical Presentation [] LOW: stable  [] MODERATE: Evolving  [] HIGH: Unstable     Decision Making/ Complexity Score: low         GOALS: Pt is in agreement with the following goals.    Short term goals:  6 weeks or 10 visits   - Pt will demonstrate increased lumbar MedX ROM by at least 3 degrees from the initial ROM value with improvements noted in functional ROM and ability to perform ADLs. Appropriate and Ongoing  - Pt will demonstrate increased MedX average isometric strength  value by 15% from initial test resulting in improved ability to perform bending, lifting, and carrying activities safely, confidently. Appropriate and Ongoing  - Pt will report a reduction in worst pain score by 1-2 points for improved tolerance for QoL. Appropriate and Ongoing  - Pt able to perform HEP correctly with minimal cueing or supervision from therapist to encourage independent management of symptoms. Appropriate and Ongoing    Long term goals: 10 weeks or 20 visits   - Pt will demonstrate increased lumbar MedX ROM by at least 6 degrees from initial ROM value, resulting in improved ability to perform functional forward bending while standing and sitting. Appropriate and Ongoing  - Pt will demonstrate increased MedX average isometric strength value by 30% from initial test resulting in improved ability to perform bending, lifting, and carrying activities safely and confidently. Appropriate and Ongoing  - Pt to demonstrate ability to independently control and reduce their pain through posture positioning and mechanical movements throughout a typical day. Appropriate and Ongoing  - Pt will demonstrate reduced pain and improved functional outcomes as reported on the FOTO by reaching an intake score of >/= 76% functional ability in order to demonstrate subjective improvement in patient's condition. . Appropriate and Ongoing  - Pt will demonstrate independence with the HEP at discharge. Appropriate and Ongoing    Plan     Outpatient physical therapy 2x week for 10 weeks or 20 visits to include the following:   - Patient education  - Therapeutic exercise  - Manual therapy  - Performance testing   - Neuromuscular Re-education  - Therapeutic activity   - Modalities    Pt may be seen by PTA as part of the rehabilitation team.     Therapist: Art Rutherford, PT  12/20/2023

## 2024-01-03 ENCOUNTER — CLINICAL SUPPORT (OUTPATIENT)
Dept: REHABILITATION | Facility: OTHER | Age: 49
End: 2024-01-03
Payer: COMMERCIAL

## 2024-01-03 DIAGNOSIS — R29.3 POOR POSTURE: Primary | ICD-10-CM

## 2024-01-03 DIAGNOSIS — M25.69 DECREASED RANGE OF MOTION OF TRUNK AND BACK: ICD-10-CM

## 2024-01-03 DIAGNOSIS — R29.898 DECREASED STRENGTH OF TRUNK AND BACK: ICD-10-CM

## 2024-01-03 PROCEDURE — 97112 NEUROMUSCULAR REEDUCATION: CPT

## 2024-01-03 PROCEDURE — 97110 THERAPEUTIC EXERCISES: CPT

## 2024-01-03 NOTE — PROGRESS NOTES
"[facility]  Physical Therapy Treatment Note     Name: Paul Frank  Clinic Number: [id number]    Therapy Diagnosis:   Encounter Diagnoses   Name Primary?    Poor posture Yes    Decreased range of motion of trunk and back     Decreased strength of trunk and back      Physician: Que Terrell MD    Visit Date: 1/3/2024    Physician Orders: PT Eval and Treat  Medical Diagnosis from Referral: M54.50,G89.29 (ICD-10-CM) - Chronic right-sided low back pain, unspecified whether sciatica present   Evaluation Date: 12/20/2023  Authorization Period Expiration: 9/10/24  Plan of Care Expiration: 2/28/2024  Reassessment Due: 2/3/2024  Visit # / Visits authorized: 2/20  MedX testing visit 2     Time In: 9:00 am  Time Out: 10:00 am  Total Billable Time: 55 minutes  INSURANCE and OUTCOMES: Fee for Service with FOTO Outcomes 1/3     Precautions: standard     Pattern of pain determined: Pattern 1     Subjective     Paul Frank reports he hasn't noticed much of a change in pain with his activities, but there is not at rest right now. He was able to try his HEP and it felt good      Patient reports tolerating previous visit well  Patient reports their pain to be  0 /10 on a 0-10 scale with 0 being no pain and 10 being the worst pain imaginable.  Pain Location: B LB     Occupation: sits at a desk, works for [employer]  Leisure: rowing        Pt goals: "To learn some techniques to help improve pain"     Objective      Lumbar  Isometric Testing on Med X equipment: Testing administered by PT    Test Initial Baseline Midpoint Final   Date 1/3/2024     ROM 0-48 deg     Max Peak Torque 181      Min Peak Torque 73      Flex/Ext Ratio 2.47:1     % below normative data 41     % gain from initial test Not available visit 1         Outcomes:  Intake Score: 67%  Visit 6 Score:   Visit 10 Score:   Discharge Score:  Goal Score: 76%     Treatment     Paul received the treatments listed below:      Medical " "MedX Treatment as follows:  MedX testing performed day 2: Patient  received neuromuscular education to engage spinal musculature correctly for motor control and engagement of musculature for 15 minutes including the MedX exercise component and practice and standard testing. MedX dynamic exercise and baseline isometric test performed with instructions to guide the patient safely through the testing procedure. Patient instructed to perform isometric test correctly and safely while building to an optimal force with a pain-free effort. Patient also instructed that they should feel support/pressure from MedX restraints but no pain/discomfort, and encouraged to report any pain to therapist. Patient demonstrated appropriate understanding of information and tolerance of test.  Education regarding purpose of test, safety during test given, and reviewed possible more soreness and strategies.           1/3/2024     9:39 AM   [facility] - Short   Visit Number 2   VAS Pain Rating 0   Treadmill Time (in min.) 5 min   Lumbar Stretches - Slouch 15   Flexion in Lying 10   Lumbar Extension - Seat Pad 0   Femur Restraint 5   Top Dead Center 24   Counterweight 214   Lumbar Flexion 48   Lumbar Extension 0   Lumbar Peak Torque 181 ft. lbs.   Lumbar Weight 60 lbs   Repetitions 5         Paul participated in neuromuscular re-education activities to improve balance, coordination, proprioception, motor control and/or posture for - minutes. The following activities were included:       Paul participated in therapeutic exercises to develop strength, endurance, ROM, flexibility, posture, and core stabilization for 40 minutes including:    LTRs 10x5"  DKTC 10x5"  PPT 15x5"  +Bridge 10x3"  SOC 15x5"    Peripheral muscle strengthening which included one set of 15-20 repetitions at a slow and controlled 10-13 second per rep pace focused on strengthening supporting musculature in order to improve body mechanics and functional mobility. " Patient and therapist focused on proper form during treatment to ensure optimal strengthening of each targeted muscle group.  Machines utilized included:Torso rotation, Leg Ext, Leg Curl, Chest Press, and Rowing  To be added next visit:Triceps, Biceps, Hip Abd, Hip Add, and Leg Press      Paul participated in dynamic functional therapeutic activities to improve functional performance and simulate household and community activities for -  minutes. The following activities were included:        Paul received manual therapy techniques for   minutes. The following activities were included:        Pt given cold pack for 5 minutes to low back in z-lie.    Patient Education and Home Exercises     Home exercises include:  LTR, DKTC, SOC, PPT  Cardio program (V5): -  Lifting education (V11): -  Posture/Lumbar roll: acquired  Fridge Magnet Discharge handout (date given): -  Equipment at home/gym membership: yes    Education provided:   - PT role and POC  - HEP    Written Home Exercises Provided: Patient instructed to cont prior HEP.  Exercises were reviewed and Paul was able to demonstrate them prior to the end of the session.  Paul demonstrated good  understanding of the education provided.     See EMR under Patient Instructions for exercises provided prior visit.    Assessment     Paul presents to second healthy back visit reporting no pain, but uncharged symptoms with activity, was able to demo HEP with Min VC for form. Pt was able to tolerate Medical MedX machine well as follows:  MedX testing performed and patient tolerated test well.  Pt was also able to complete half of the peripheral strengthening exercises without increased discomfort and will complete the complete circuit next visit as tolerated.    Patient is making good progress towards established goals.  Pt will continue to benefit from skilled outpatient physical therapy to address the deficits stated in the impairment chart, provide pt/family  education and to maximize pt's level of independence in the home and community environment.     Anticipated Barriers for therapy: none  Pt's spiritual, cultural and educational needs considered and pt agreeable to plan of care and goals as stated below:     GOALS: Pt is in agreement with the following goals.     Short term goals:  6 weeks or 10 visits   - Pt will demonstrate increased lumbar MedX ROM by at least 3 degrees from the initial ROM value with improvements noted in functional ROM and ability to perform ADLs. Appropriate and Ongoing  - Pt will demonstrate increased MedX average isometric strength value by 15% from initial test resulting in improved ability to perform bending, lifting, and carrying activities safely, confidently. Appropriate and Ongoing  - Pt will report a reduction in worst pain score by 1-2 points for improved tolerance for QoL. Appropriate and Ongoing  - Pt able to perform HEP correctly with minimal cueing or supervision from therapist to encourage independent management of symptoms. Appropriate and Ongoing     Long term goals: 10 weeks or 20 visits   - Pt will demonstrate increased lumbar MedX ROM by at least 6 degrees from initial ROM value, resulting in improved ability to perform functional forward bending while standing and sitting. Appropriate and Ongoing  - Pt will demonstrate increased MedX average isometric strength value by 30% from initial test resulting in improved ability to perform bending, lifting, and carrying activities safely and confidently. Appropriate and Ongoing  - Pt to demonstrate ability to independently control and reduce their pain through posture positioning and mechanical movements throughout a typical day. Appropriate and Ongoing  - Pt will demonstrate reduced pain and improved functional outcomes as reported on the FOTO by reaching an intake score of >/= 76% functional ability in order to demonstrate subjective improvement in patient's condition. .  Appropriate and Ongoing  - Pt will demonstrate independence with the HEP at discharge. Appropriate and Ongoing    Plan     Continue with established Plan of Care towards established PT goals.     Therapist: Kobe Escobar, PT  1/3/2024

## 2024-01-18 NOTE — PROGRESS NOTES
"[facility]  Physical Therapy Treatment Note     Name: Paul Frank  Clinic Number: [id number]    Therapy Diagnosis:   Encounter Diagnoses   Name Primary?    Poor posture Yes    Decreased range of motion of trunk and back     Decreased strength of trunk and back        Physician: Que Terrell MD    Visit Date: 1/19/2024    Physician Orders: PT Eval and Treat  Medical Diagnosis from Referral: M54.50,G89.29 (ICD-10-CM) - Chronic right-sided low back pain, unspecified whether sciatica present   Evaluation Date: 12/20/2023  Authorization Period Expiration: 9/10/24  Plan of Care Expiration: 2/28/2024  Reassessment Due: 2/3/2024  Visit # / Visits authorized: 3/20  MedX testing visit 2     Time In: 8:58  am  Time Out: 9:58  am  Total Billable Time: 55 minutes  INSURANCE and OUTCOMES: Fee for Service with FOTO Outcomes 1/3  Precautions: standard     Pattern of pain determined: Pattern 1     Subjective     Paul Frank reports his LBP is only coming up after prolong standing or sitting. He says he's been getting LBP with sit ups at the gym.     Patient reports tolerating previous visit well  Patient reports their pain to be  1 /10 on a 0-10 scale with 0 being no pain and 10 being the worst pain imaginable.  Pain Location: B LB     Occupation: sits at a desk, works for [employer]  Leisure: rowing        Pt goals: "To learn some techniques to help improve pain"     Objective      Lumbar  Isometric Testing on Med X equipment: Testing administered by PT    Test Initial Baseline Midpoint Final   Date 1/3/2024     ROM 0-48 deg     Max Peak Torque 181      Min Peak Torque 73      Flex/Ext Ratio 2.47:1     % below normative data 41     % gain from initial test Not available visit 1         Outcomes:  Intake Score: 67%  Visit 6 Score:   Visit 10 Score:   Discharge Score:  Goal Score: 76%     Treatment     Paul received the treatments listed below:      Medical MedX Treatment as " "follows:  MedX testing performed day 2: Patient  received neuromuscular education to engage spinal musculature correctly for motor control and engagement of musculature for 0 minutes including the MedX exercise component and practice and standard testing. MedX dynamic exercise and baseline isometric test performed with instructions to guide the patient safely through the testing procedure. Patient instructed to perform isometric test correctly and safely while building to an optimal force with a pain-free effort. Patient also instructed that they should feel support/pressure from MedX restraints but no pain/discomfort, and encouraged to report any pain to therapist. Patient demonstrated appropriate understanding of information and tolerance of test.  Education regarding purpose of test, safety during test given, and reviewed possible more soreness and strategies.         Paul participated in neuromuscular re-education activities to improve balance, coordination, proprioception, motor control and/or posture for 30 minutes. The following activities were included:     SOC 15x5"  PPT 15x5"  + EIL x10  + EIS x 10  + Dying bugs x 10        1/19/2024     9:17 AM   [facility]   Visit Number 3   VAS Pain Rating 1   Treadmill Time (in min.) 5 min   Lumbar Stretches - Slouch Overcorrection 10   Extension in Lying 10   Extension in Standing 10   Lumbar Weight 60 lbs   Repetitions 15   Rating of Perceived Exertion 3   Ice - Z Lie (in min.) 5         Paul participated in therapeutic exercises to develop strength, endurance, ROM, flexibility, posture, and core stabilization for 25 minutes including:    LTRs 10x5"  DKTC 10x5"  Bridge w/ BTB 10x3"    Peripheral muscle strengthening which included one set of 15-20 repetitions at a slow and controlled 10-13 second per rep pace focused on strengthening supporting musculature in order to improve body mechanics and functional mobility. Patient and therapist focused on proper " form during treatment to ensure optimal strengthening of each targeted muscle group.  Machines utilized included:Torso rotation, Leg Ext, Leg Curl, Chest Press, and Rowing  To be added next visit:Triceps, Biceps, Hip Abd, Hip Add, and Leg Press      Paul participated in dynamic functional therapeutic activities to improve functional performance and simulate household and community activities for -  minutes. The following activities were included:      Paul received manual therapy techniques for   minutes. The following activities were included:      Pt given cold pack for 5 minutes to low back in z-lie.    Patient Education and Home Exercises     Home exercises include:  LTR, DKTC, SOC, PPT    Cardio program (V5): -  Lifting education (V11): -  Posture/Lumbar roll: acquired  [employer] Discharge handout (date given): -  Equipment at home/gym membership: yes    Education provided:   - PT role and POC  - HEP    Written Home Exercises Provided: Patient instructed to cont prior HEP.  Exercises were reviewed and Paul was able to demonstrate them prior to the end of the session.  Paul demonstrated good  understanding of the education provided.     See EMR under Patient Instructions for exercises provided prior visit.    Assessment     Paul presents with report of LBP that arises primarily with prolong positioning. Patient reports benefit from use of lumbar roll. He displays good neuromuscular control when performing a posterior pelvic tilt and recruiting abdominals, therefore progressed this exercise in HEP to include distal mobility. Additionally, extension based exercises were introduced and patient prescribed weight bearing lumbar extension in order to counter prolong lumbar flexion posture with work related desk activities. Patient commenced Lumbar MedX training with weight at 60 ft. Lbs. And he was able to complete 15 repetitions with an RPE of 3/10. All peripheral strength circuit exercises completed  without complication. Continue to progress patient through HB protocol as tolerated.      Patient is making good progress towards established goals.  Pt will continue to benefit from skilled outpatient physical therapy to address the deficits stated in the impairment chart, provide pt/family education and to maximize pt's level of independence in the home and community environment.     Anticipated Barriers for therapy: none  Pt's spiritual, cultural and educational needs considered and pt agreeable to plan of care and goals as stated below:     GOALS: Pt is in agreement with the following goals.     Short term goals:  6 weeks or 10 visits   - Pt will demonstrate increased lumbar MedX ROM by at least 3 degrees from the initial ROM value with improvements noted in functional ROM and ability to perform ADLs. Appropriate and Ongoing  - Pt will demonstrate increased MedX average isometric strength value by 15% from initial test resulting in improved ability to perform bending, lifting, and carrying activities safely, confidently. Appropriate and Ongoing  - Pt will report a reduction in worst pain score by 1-2 points for improved tolerance for QoL. Appropriate and Ongoing  - Pt able to perform HEP correctly with minimal cueing or supervision from therapist to encourage independent management of symptoms. Appropriate and Ongoing     Long term goals: 10 weeks or 20 visits   - Pt will demonstrate increased lumbar MedX ROM by at least 6 degrees from initial ROM value, resulting in improved ability to perform functional forward bending while standing and sitting. Appropriate and Ongoing  - Pt will demonstrate increased MedX average isometric strength value by 30% from initial test resulting in improved ability to perform bending, lifting, and carrying activities safely and confidently. Appropriate and Ongoing  - Pt to demonstrate ability to independently control and reduce their pain through posture positioning and mechanical  movements throughout a typical day. Appropriate and Ongoing  - Pt will demonstrate reduced pain and improved functional outcomes as reported on the FOTO by reaching an intake score of >/= 76% functional ability in order to demonstrate subjective improvement in patient's condition. . Appropriate and Ongoing  - Pt will demonstrate independence with the HEP at discharge. Appropriate and Ongoing    Plan     Continue with established Plan of Care towards established PT goals.     Therapist: Shauna Siddiqui, PT  1/19/2024

## 2024-01-19 ENCOUNTER — CLINICAL SUPPORT (OUTPATIENT)
Dept: REHABILITATION | Facility: OTHER | Age: 49
End: 2024-01-19
Payer: COMMERCIAL

## 2024-01-19 DIAGNOSIS — M25.69 DECREASED RANGE OF MOTION OF TRUNK AND BACK: ICD-10-CM

## 2024-01-19 DIAGNOSIS — R29.3 POOR POSTURE: Primary | ICD-10-CM

## 2024-01-19 DIAGNOSIS — R29.898 DECREASED STRENGTH OF TRUNK AND BACK: ICD-10-CM

## 2024-01-19 PROCEDURE — 97112 NEUROMUSCULAR REEDUCATION: CPT

## 2024-01-19 PROCEDURE — 97110 THERAPEUTIC EXERCISES: CPT

## 2024-01-29 ENCOUNTER — CLINICAL SUPPORT (OUTPATIENT)
Dept: REHABILITATION | Facility: OTHER | Age: 49
End: 2024-01-29
Payer: COMMERCIAL

## 2024-01-29 DIAGNOSIS — M25.69 DECREASED RANGE OF MOTION OF TRUNK AND BACK: ICD-10-CM

## 2024-01-29 DIAGNOSIS — R29.898 DECREASED STRENGTH OF TRUNK AND BACK: ICD-10-CM

## 2024-01-29 DIAGNOSIS — R29.3 POOR POSTURE: Primary | ICD-10-CM

## 2024-01-29 PROCEDURE — 97110 THERAPEUTIC EXERCISES: CPT

## 2024-01-29 PROCEDURE — 97112 NEUROMUSCULAR REEDUCATION: CPT

## 2024-01-29 NOTE — PROGRESS NOTES
"[facility]  Physical Therapy Treatment Note     Name: Paul Frank  Clinic Number: [id number]    Therapy Diagnosis:   Encounter Diagnoses   Name Primary?    Poor posture Yes    Decreased range of motion of trunk and back     Decreased strength of trunk and back        Physician: Que Terrell MD    Visit Date: 1/29/2024    Physician Orders: PT Eval and Treat  Medical Diagnosis from Referral: M54.50,G89.29 (ICD-10-CM) - Chronic right-sided low back pain, unspecified whether sciatica present   Evaluation Date: 12/20/2023  Authorization Period Expiration: 9/10/24  Plan of Care Expiration: 2/28/2024  Reassessment Due: 2/3/2024  Visit # / Visits authorized: 4/20  MedX testing visit 2     Time In: 4:00    Time Out: 4:55  Total Billable Time: 55 minutes  INSURANCE and OUTCOMES: Fee for Service with FOTO Outcomes 1/3  Precautions: standard     Pattern of pain determined: Pattern 1     Subjective     Paul Frank denies back pain at this time and feeling better overall    Patient reports tolerating previous visit well  Patient reports their pain to be  0 /10 on a 0-10 scale with 0 being no pain and 10 being the worst pain imaginable.  Pain Location: B LB     Occupation: sits at a desk, works for [employer]  Leisure: rowing        Pt goals: "To learn some techniques to help improve pain"     Objective      Lumbar  Isometric Testing on Med X equipment: Testing administered by PT    Test Initial Baseline Midpoint Final   Date 1/3/2024     ROM 0-48 deg     Max Peak Torque 181      Min Peak Torque 73      Flex/Ext Ratio 2.47:1     % below normative data 41     % gain from initial test Not available visit 1         Outcomes:  Intake Score: 67%  Visit 6 Score:   Visit 10 Score:   Discharge Score:  Goal Score: 76%     Treatment     Paul received the treatments listed below:      Medical MedX Treatment as follows:  MedX testing performed day 2: Patient  received neuromuscular education to " "engage spinal musculature correctly for motor control and engagement of musculature for 0 minutes including the MedX exercise component and practice and standard testing. MedX dynamic exercise and baseline isometric test performed with instructions to guide the patient safely through the testing procedure. Patient instructed to perform isometric test correctly and safely while building to an optimal force with a pain-free effort. Patient also instructed that they should feel support/pressure from MedX restraints but no pain/discomfort, and encouraged to report any pain to therapist. Patient demonstrated appropriate understanding of information and tolerance of test.  Education regarding purpose of test, safety during test given, and reviewed possible more soreness and strategies.         Paul participated in neuromuscular re-education activities to improve balance, coordination, proprioception, motor control and/or posture for 30 minutes. The following activities were included:     SOC 15x5"  PPT 15x5"  + EIL x10  EIS x 10  Dying bugs x 10  + side plank clam x15  +Cat camel x10        1/19/2024     9:17 AM   [facility]   Visit Number 3   VAS Pain Rating 1   Treadmill Time (in min.) 5 min   Lumbar Stretches - Slouch Overcorrection 10   Extension in Lying 10   Extension in Standing 10   Lumbar Weight 60 lbs   Repetitions 15   Rating of Perceived Exertion 3   Ice - Z Lie (in min.) 5         Paul participated in therapeutic exercises to develop strength, endurance, ROM, flexibility, posture, and core stabilization for 25 minutes including:    LTRs 10x5"  DKTC 10x5"  Posterior pelvic tilt + Bridge w/ BTB 2x10x3"  Bridge with March BTB x10    Peripheral muscle strengthening which included one set of 15-20 repetitions at a slow and controlled 10-13 second per rep pace focused on strengthening supporting musculature in order to improve body mechanics and functional mobility. Patient and therapist focused on " proper form during treatment to ensure optimal strengthening of each targeted muscle group.  Machines utilized included:Torso rotation, Leg Ext, Leg Curl, Chest Press, and Rowing  To be added next visit:Triceps, Biceps, Hip Abd, Hip Add, and Leg Press      Paul participated in dynamic functional therapeutic activities to improve functional performance and simulate household and community activities for -  minutes. The following activities were included:      Paul received manual therapy techniques for   minutes. The following activities were included:      Pt given cold pack for 5 minutes to low back in z-lie.    Patient Education and Home Exercises     Home exercises include:  LTR, DKTC, SOC, PPT    Cardio program (V5): -  Lifting education (V11): -  Posture/Lumbar roll: acquired  [employer] Discharge handout (date given): -  Equipment at home/gym membership: yes    Education provided:   - PT role and POC  - HEP    Written Home Exercises Provided: Patient instructed to cont prior HEP.  Exercises were reviewed and Paul was able to demonstrate them prior to the end of the session.  Paul demonstrated good  understanding of the education provided.     See EMR under Patient Instructions for exercises provided prior visit.    Assessment     Paul presents with no complaints of low back pain and feeling better over all. Continued with stretches and progressed strengthening to unilateral and the stretches for rotation and lumbar mobility. He was able to complete stretches without increased pain but reports of challenge. Resumed extension exercises and plan to continue to progress as tolerated. Patient commenced Lumbar MedX training with weight at 60 ft. Lbs. And he was able to complete 15 repetitions with an RPE of 3/10. All peripheral strength circuit exercises completed without complication. Continue to progress patient through HB protocol as tolerated.      Patient is making good progress towards established  goals.  Pt will continue to benefit from skilled outpatient physical therapy to address the deficits stated in the impairment chart, provide pt/family education and to maximize pt's level of independence in the home and community environment.     Anticipated Barriers for therapy: none  Pt's spiritual, cultural and educational needs considered and pt agreeable to plan of care and goals as stated below:     GOALS: Pt is in agreement with the following goals.     Short term goals:  6 weeks or 10 visits   - Pt will demonstrate increased lumbar MedX ROM by at least 3 degrees from the initial ROM value with improvements noted in functional ROM and ability to perform ADLs. Appropriate and Ongoing  - Pt will demonstrate increased MedX average isometric strength value by 15% from initial test resulting in improved ability to perform bending, lifting, and carrying activities safely, confidently. Appropriate and Ongoing  - Pt will report a reduction in worst pain score by 1-2 points for improved tolerance for QoL. Appropriate and Ongoing  - Pt able to perform HEP correctly with minimal cueing or supervision from therapist to encourage independent management of symptoms. Appropriate and Ongoing     Long term goals: 10 weeks or 20 visits   - Pt will demonstrate increased lumbar MedX ROM by at least 6 degrees from initial ROM value, resulting in improved ability to perform functional forward bending while standing and sitting. Appropriate and Ongoing  - Pt will demonstrate increased MedX average isometric strength value by 30% from initial test resulting in improved ability to perform bending, lifting, and carrying activities safely and confidently. Appropriate and Ongoing  - Pt to demonstrate ability to independently control and reduce their pain through posture positioning and mechanical movements throughout a typical day. Appropriate and Ongoing  - Pt will demonstrate reduced pain and improved functional outcomes as reported  on the FOTO by reaching an intake score of >/= 76% functional ability in order to demonstrate subjective improvement in patient's condition. . Appropriate and Ongoing  - Pt will demonstrate independence with the HEP at discharge. Appropriate and Ongoing    Plan     Continue with established Plan of Care towards established PT goals.     Therapist: Jessa Gomez, PT  1/29/2024

## 2024-02-01 ENCOUNTER — CLINICAL SUPPORT (OUTPATIENT)
Dept: REHABILITATION | Facility: OTHER | Age: 49
End: 2024-02-01
Payer: COMMERCIAL

## 2024-02-01 DIAGNOSIS — R29.3 POOR POSTURE: Primary | ICD-10-CM

## 2024-02-01 DIAGNOSIS — R29.898 DECREASED STRENGTH OF TRUNK AND BACK: ICD-10-CM

## 2024-02-01 DIAGNOSIS — M25.69 DECREASED RANGE OF MOTION OF TRUNK AND BACK: ICD-10-CM

## 2024-02-01 PROCEDURE — 97110 THERAPEUTIC EXERCISES: CPT | Mod: CQ

## 2024-02-01 PROCEDURE — 97112 NEUROMUSCULAR REEDUCATION: CPT | Mod: CQ

## 2024-02-01 NOTE — PROGRESS NOTES
"[facility]  Physical Therapy Treatment Note     Name: Paul Frank  Clinic Number: [id number]    Therapy Diagnosis:   Encounter Diagnoses   Name Primary?    Poor posture Yes    Decreased range of motion of trunk and back     Decreased strength of trunk and back          Physician: Que Terrell MD    Visit Date: 2/1/2024    Physician Orders: PT Eval and Treat  Medical Diagnosis from Referral: M54.50,G89.29 (ICD-10-CM) - Chronic right-sided low back pain, unspecified whether sciatica present   Evaluation Date: 12/20/2023  Authorization Period Expiration: 9/10/24  Plan of Care Expiration: 2/28/2024  Reassessment Due: 2/3/2024  Visit # / Visits authorized: 4/20  MedX testing visit 2     Time In: 4:00    Time Out: 4:55  Total Billable Time: 55 minutes  INSURANCE and OUTCOMES: Fee for Service with FOTO Outcomes 1/3  Precautions: standard     Pattern of pain determined: Pattern 1     Subjective     Paul Frank denies back pain at this time and feeling better overall    Patient reports tolerating previous visit well  Patient reports their pain to be  1 /10 on a 0-10 scale with 0 being no pain and 10 being the worst pain imaginable.  Pain Location: B LB     Occupation: sits at a desk, works for [employer]  Leisure: rowing        Pt goals: "To learn some techniques to help improve pain"     Objective      Lumbar  Isometric Testing on Med X equipment: Testing administered by PT    Test Initial Baseline Midpoint Final   Date 1/3/2024     ROM 0-48 deg     Max Peak Torque 181      Min Peak Torque 73      Flex/Ext Ratio 2.47:1     % below normative data 41     % gain from initial test Not available visit 1         Outcomes:  Intake Score: 67%  Visit 6 Score:   Visit 10 Score:   Discharge Score:  Goal Score: 76%     Treatment     Paul received the treatments listed below:      Medical MedX Treatment as follows:  MedX testing performed day 2: Patient  received neuromuscular education to " "engage spinal musculature correctly for motor control and engagement of musculature for 0 minutes including the MedX exercise component and practice and standard testing. MedX dynamic exercise and baseline isometric test performed with instructions to guide the patient safely through the testing procedure. Patient instructed to perform isometric test correctly and safely while building to an optimal force with a pain-free effort. Patient also instructed that they should feel support/pressure from MedX restraints but no pain/discomfort, and encouraged to report any pain to therapist. Patient demonstrated appropriate understanding of information and tolerance of test.  Education regarding purpose of test, safety during test given, and reviewed possible more soreness and strategies.         Paul participated in neuromuscular re-education activities to improve balance, coordination, proprioception, motor control and/or posture for 30 minutes. The following activities were included:     SOC 15x5"  PPT 15x5"  Dying bugs x 10  Side plank clam x15  +Bird dogs x 10        2/1/2024     3:48 PM   [facility]   Visit Number 5   VAS Pain Rating 1   Treadmill Time (in min.) 5 min   Extension in Lying 10   Extension in Standing 10   Flexion in Lying 10   Lumbar Weight 66 lbs   Repetitions 20   Rating of Perceived Exertion 4   Ice - Z Lie (in min.) 5     Paul participated in therapeutic exercises to develop strength, endurance, ROM, flexibility, posture, and core stabilization for 25 minutes including:    LTRs 10x5"  DKTC 10x5"  Posterior pelvic tilt + Bridge w/ BTB 2x10x3"  Bridge with March BTB x10  EIL x10  EIS x 10  Cat /camel x10    Peripheral muscle strengthening which included one set of 15-20 repetitions at a slow and controlled 10-13 second per rep pace focused on strengthening supporting musculature in order to improve body mechanics and functional mobility. Patient and therapist focused on proper form during " treatment to ensure optimal strengthening of each targeted muscle group.  Machines utilized included:Torso rotation, Leg Ext, Leg Curl, Chest Press, and Rowing  To be added next visit:Triceps, Biceps, Hip Abd, Hip Add, and Leg Press      Paul participated in dynamic functional therapeutic activities to improve functional performance and simulate household and community activities for -  minutes. The following activities were included:      Paul received manual therapy techniques for   minutes. The following activities were included:      Pt given cold pack for 5 minutes to low back in z-lie.    Patient Education and Home Exercises     Home exercises include:  LTR, DKTC, SOC, PPT    Cardio program (V5): -  Lifting education (V11): -  Posture/Lumbar roll: acquired  Fridge Magnet Discharge handout (date given): -  Equipment at home/gym membership: yes    Education provided:   - PT role and POC  - HEP    Written Home Exercises Provided: Patient instructed to cont prior HEP.  Exercises were reviewed and Paul was able to demonstrate them prior to the end of the session.  Paul demonstrated good  understanding of the education provided.     See EMR under Patient Instructions for exercises provided prior visit.    Assessment     Paul presents with no complaints of low back pain and feeling better over all. Continued with stretches for unilateral and stretches for rotation and lumbar mobility. Treatment continued with lumbopelvic/core strengthening ex's adding bird dogs, all which he was able to perform without difficulty. Lumbar MedX training with weight at 66 ft. Lbs,  completed 20 repetitions with an RPE of 44/10. All peripheral strength circuit exercises completed without complication. Continue to progress patient through HB protocol as tolerated.      Patient is making good progress towards established goals.  Pt will continue to benefit from skilled outpatient physical therapy to address the deficits stated in  the impairment chart, provide pt/family education and to maximize pt's level of independence in the home and community environment.     Anticipated Barriers for therapy: none  Pt's spiritual, cultural and educational needs considered and pt agreeable to plan of care and goals as stated below:     GOALS: Pt is in agreement with the following goals.     Short term goals:  6 weeks or 10 visits   - Pt will demonstrate increased lumbar MedX ROM by at least 3 degrees from the initial ROM value with improvements noted in functional ROM and ability to perform ADLs. Appropriate and Ongoing  - Pt will demonstrate increased MedX average isometric strength value by 15% from initial test resulting in improved ability to perform bending, lifting, and carrying activities safely, confidently. Appropriate and Ongoing  - Pt will report a reduction in worst pain score by 1-2 points for improved tolerance for QoL. Appropriate and Ongoing  - Pt able to perform HEP correctly with minimal cueing or supervision from therapist to encourage independent management of symptoms. Appropriate and Ongoing     Long term goals: 10 weeks or 20 visits   - Pt will demonstrate increased lumbar MedX ROM by at least 6 degrees from initial ROM value, resulting in improved ability to perform functional forward bending while standing and sitting. Appropriate and Ongoing  - Pt will demonstrate increased MedX average isometric strength value by 30% from initial test resulting in improved ability to perform bending, lifting, and carrying activities safely and confidently. Appropriate and Ongoing  - Pt to demonstrate ability to independently control and reduce their pain through posture positioning and mechanical movements throughout a typical day. Appropriate and Ongoing  - Pt will demonstrate reduced pain and improved functional outcomes as reported on the FOTO by reaching an intake score of >/= 76% functional ability in order to demonstrate subjective  improvement in patient's condition. . Appropriate and Ongoing  - Pt will demonstrate independence with the HEP at discharge. Appropriate and Ongoing    Plan     Continue with established Plan of Care towards established PT goals.     Therapist: Crystal Castillo, PTA  2/1/2024

## 2024-02-07 ENCOUNTER — CLINICAL SUPPORT (OUTPATIENT)
Dept: REHABILITATION | Facility: OTHER | Age: 49
End: 2024-02-07
Payer: COMMERCIAL

## 2024-02-07 DIAGNOSIS — M25.69 DECREASED RANGE OF MOTION OF TRUNK AND BACK: ICD-10-CM

## 2024-02-07 DIAGNOSIS — R29.898 DECREASED STRENGTH OF TRUNK AND BACK: ICD-10-CM

## 2024-02-07 DIAGNOSIS — R29.3 POOR POSTURE: Primary | ICD-10-CM

## 2024-02-07 PROCEDURE — 97112 NEUROMUSCULAR REEDUCATION: CPT

## 2024-02-07 PROCEDURE — 97110 THERAPEUTIC EXERCISES: CPT

## 2024-02-07 NOTE — PROGRESS NOTES
"[facility]  Physical Therapy Treatment Note     Name: Paul Frank  Clinic Number: [id number]    Therapy Diagnosis:   Encounter Diagnoses   Name Primary?    Poor posture Yes    Decreased range of motion of trunk and back     Decreased strength of trunk and back          Physician: Que Terrell MD    Visit Date: 2/7/2024    Physician Orders: PT Eval and Treat  Medical Diagnosis from Referral: M54.50,G89.29 (ICD-10-CM) - Chronic right-sided low back pain, unspecified whether sciatica present   Evaluation Date: 12/20/2023  Authorization Period Expiration: 9/10/24  Plan of Care Expiration: 2/28/2024  Reassessment Due: 3/7/2024  Visit # / Visits authorized: 6/20  MedX testing visit 2     Time In: 4:00    Time Out: 4:55  Total Billable Time: 55 minutes  INSURANCE and OUTCOMES: Fee for Service with FOTO Outcomes 1/3  Precautions: standard     Pattern of pain determined: Pattern 1     Subjective     Paul Frank reports he has no pain right now, he is encouraged with his progress.     Patient reports tolerating previous visit well  Patient reports their pain to be  0 /10 on a 0-10 scale with 0 being no pain and 10 being the worst pain imaginable.  Pain Location: B LB     Occupation: sits at a desk, works for [employer]  Leisure: rowing        Pt goals: "To learn some techniques to help improve pain"     Objective      Lumbar  Isometric Testing on Med X equipment: Testing administered by PT    Test Initial Baseline Midpoint Final   Date 1/3/2024     ROM 0-48 deg     Max Peak Torque 181      Min Peak Torque 73      Flex/Ext Ratio 2.47:1     % below normative data 41     % gain from initial test Not available visit 1         Outcomes:  Intake Score: 67%  Visit 6 Score:   Visit 10 Score:   Discharge Score:  Goal Score: 76%     Treatment     Paul received the treatments listed below:      Paul participated in neuromuscular re-education activities to improve balance, coordination, " "proprioception, motor control and/or posture for 25 minutes. The following activities were included:    Medical MedX Treatment as follows:  MedX testing performed day 2: Patient  received neuromuscular education to engage spinal musculature correctly for motor control and engagement of musculature for 10 minutes including the MedX exercise component and practice and standard testing. MedX dynamic exercise and baseline isometric test performed with instructions to guide the patient safely through the testing procedure. Patient instructed to perform isometric test correctly and safely while building to an optimal force with a pain-free effort. Patient also instructed that they should feel support/pressure from MedX restraints but no pain/discomfort, and encouraged to report any pain to therapist. Patient demonstrated appropriate understanding of information and tolerance of test.  Education regarding purpose of test, safety during test given, and reviewed possible more soreness and strategies.       PPT 15x5"  Dying bugs x 10  Side plank clam x15  Bird dogs x 10  +Paloff press GTB x15 ea side         2/7/2024     3:30 PM   [facility]   Visit Number 6   VAS Pain Rating 0   Time 5   Extension in Lying 10   Lumbar Weight 72 lbs   Repetitions 20   Rating of Perceived Exertion 3         Paul participated in therapeutic exercises to develop strength, endurance, ROM, flexibility, posture, and core stabilization for 30 minutes including:    Posterior pelvic tilt + Bridge w/ BTB x10x3"  +Bridge with +kick x10  EIL x10  EIS x 10  Cat /camel x10      NP:  SOC 15x5"  LTRs 10x5"  DKTC 10x5"    Peripheral muscle strengthening which included one set of 15-20 repetitions at a slow and controlled 10-13 second per rep pace focused on strengthening supporting musculature in order to improve body mechanics and functional mobility. Patient and therapist focused on proper form during treatment to ensure optimal " strengthening of each targeted muscle group.  Machines utilized included:Torso rotation, Leg Ext, Leg Curl, Chest Press, and Rowing  To be added next visit:Triceps, Biceps, Hip Abd, Hip Add, and Leg Press      Paul participated in dynamic functional therapeutic activities to improve functional performance and simulate household and community activities for -  minutes. The following activities were included:      Paul received manual therapy techniques for   minutes. The following activities were included:      Pt given cold pack for 5 minutes to low back in z-lie.    Patient Education and Home Exercises     Home exercises include:  LTR, DKTC, SOC, PPT    Cardio program (V5): -  Lifting education (V11): -  Posture/Lumbar roll: acquired  Fridge Magnet Discharge handout (date given): -  Equipment at home/gym membership: yes    Education provided:   - PT role and POC  - HEP    Written Home Exercises Provided: Patient instructed to cont prior HEP.  Exercises were reviewed and Paul was able to demonstrate them prior to the end of the session.  Paul demonstrated good  understanding of the education provided.     See EMR under Patient Instructions for exercises provided prior visit.    Assessment     Paul presents today without complaints of pain. Progressed dynamic core strengthening and stability exercises with good tolerance, pt could tolerate more advanced progressions next visit. Appropriate challenge with paloff press and bridge with kick additions today. Medx resistance progressed to 72 ft/lbs and pt was able to complete 20 reps with 3/10 rPE. Continue to progress as tolerated.     Patient is making good progress towards established goals.  Pt will continue to benefit from skilled outpatient physical therapy to address the deficits stated in the impairment chart, provide pt/family education and to maximize pt's level of independence in the home and community environment.     Anticipated Barriers for  therapy: none  Pt's spiritual, cultural and educational needs considered and pt agreeable to plan of care and goals as stated below:     GOALS: Pt is in agreement with the following goals.     Short term goals:  6 weeks or 10 visits   - Pt will demonstrate increased lumbar MedX ROM by at least 3 degrees from the initial ROM value with improvements noted in functional ROM and ability to perform ADLs. Appropriate and Ongoing  - Pt will demonstrate increased MedX average isometric strength value by 15% from initial test resulting in improved ability to perform bending, lifting, and carrying activities safely, confidently. Appropriate and Ongoing  - Pt will report a reduction in worst pain score by 1-2 points for improved tolerance for QoL. Appropriate and Ongoing  - Pt able to perform HEP correctly with minimal cueing or supervision from therapist to encourage independent management of symptoms. Appropriate and Ongoing     Long term goals: 10 weeks or 20 visits   - Pt will demonstrate increased lumbar MedX ROM by at least 6 degrees from initial ROM value, resulting in improved ability to perform functional forward bending while standing and sitting. Appropriate and Ongoing  - Pt will demonstrate increased MedX average isometric strength value by 30% from initial test resulting in improved ability to perform bending, lifting, and carrying activities safely and confidently. Appropriate and Ongoing  - Pt to demonstrate ability to independently control and reduce their pain through posture positioning and mechanical movements throughout a typical day. Appropriate and Ongoing  - Pt will demonstrate reduced pain and improved functional outcomes as reported on the FOTO by reaching an intake score of >/= 76% functional ability in order to demonstrate subjective improvement in patient's condition. . Appropriate and Ongoing  - Pt will demonstrate independence with the HEP at discharge. Appropriate and Ongoing    Plan      Continue with established Plan of Care towards established PT goals.     Therapist: Kobe Escobar, PT  2/7/2024

## 2024-09-03 NOTE — PROGRESS NOTES
This note was created by combination of typed  and M-Modal dictation.  Transcription errors may be present.  If there are any questions, please contact me.    Assessment and Plan:   Assessment and Plan   Normal physical exam  -return for fasting labs   Check PSA   Colonoscopy done 2023 repeat 2033  He declines Shingrix vaccination today  -     CBC Auto Differential; Future; Expected date: 09/04/2024  -     Comprehensive Metabolic Panel; Future; Expected date: 09/04/2024  -     Lipid Panel; Future; Expected date: 09/04/2024  -     Hemoglobin A1C; Future; Expected date: 09/04/2024  -     TSH; Future; Expected date: 09/04/2024  -     PSA, Screening; Future; Expected date: 09/04/2024  -     CBC Without Differential; Future; Expected date: 09/03/2025  -     Comprehensive Metabolic Panel; Future; Expected date: 09/03/2025  -     Lipid Panel; Future; Expected date: 09/03/2025  -     PSA, Screening; Future; Expected date: 09/03/2025    Dyslipidemia  -check labs on rosuvastatin 10.  LDL goal less than 100.  Will send in refills accordingly and titrate up if necessary    Degenerative disc disease, cervical  Chronic bilateral low back pain with bilateral sciatica  -finding his back and neck pain increasingly bothersome.  With what sounds like bilateral sciatica  Taking Motrin 200 mg once daily.  Discussed that he may continue it, we also discussed side effect profile of chronic NSAIDs.    Will check x-ray C-spine and L-spine   And refer to pain clinic to discuss management options.    Doing self-directed physical therapy.  -     X-Ray Cervical Spine AP And Lateral; Future; Expected date: 09/04/2024  -     Ambulatory referral/consult to Pain Clinic; Future; Expected date: 09/11/2024  -     X-Ray Lumbar Spine Ap Lateral w/Flex Ext; Future; Expected date: 09/04/2024    Aortic root dilatation  -incidental on echo.  Referral to Cardiology for evaluation and recommendations for surveillance  -     Ambulatory  referral/consult to Cardiology; Future; Expected date: 09/11/2024    There are no discontinued medications.    meds sent this encounter:         Follow Up:  Physical exam 1 year with PCP  Future Appointments   Date Time Provider Department Center   9/4/2025  9:40 AM Que Terrell MD [facility]            Subjective:   Subjective   Chief Complaint   Patient presents with    Annual Exam    Back Pain    Neck Pain       HPI  Paul is a 48 y.o. male.     Social History     Tobacco Use    Smoking status: Never    Smokeless tobacco: Never   Substance Use Topics    Alcohol use: Yes     Alcohol/week: 0.0 standard drinks of alcohol     Types: 1 - 2 Cans of beer per week     Comment: socially      Social History     Occupational History    Occupation:      Employer: [employer]      Social History     Social History Narrative    He exercises regularly.    He is a  - [employer]       Last appointment with this clinic was Visit date not found. Last visit with me Visit date not found   To summarize last visit and events leading up to today:  New to me   Hyperlipidemia   Chronic low back pain and chronic neck pain with a known history of DJD  03/10/2023 colonoscopy normal repeat 10 years    09/2023 initiated on rosuvastatin 10    02/22/2024 outside TTE LV normal systolic function LVEF 55-60%.  Mildly dilated ascending aorta 3.46.  Bubble study positive    Seeing outside cardiology?    Last fill on rosuvastatin 12/2023    Last TSH 2012    Needs labs    Today's visit:    Chronic neck and back pain  Worsening in the past 5 years.  Takes OTC   And has tried PT in the past.    Throbbing pain, comes and goes.    Can radiate in both legs with numbing and tingling cori with standing or sitting for long time.    Daily motrin 1 pill once daily.     Working with [facility] for evaluation for service related disability  Had complained of chest pain   So he got an echo done   Which showed an ASD and aortic root dilatation   And he  received a notice that he should follow-up with primary care about this.    The chest pain is fleeting, can occur at rest, it is a sharp pain in the chest, lasts for maybe a 2nd or so.    Not related to physical activity.    Taking statin, denies side effects.    I would like to see his LDL below 100.    Answers submitted by the patient for this visit:  Back Pain Questionnaire (Submitted on 9/1/2024)  Chief Complaint: Back pain  Chronicity: chronic  Onset: more than 1 year ago  Frequency: daily  Progression since onset: gradually worsening  Pain location: gluteal, lumbar spine, sacro-iliac, thoracic spine  Pain quality: aching, cramping, shooting, stabbing  Radiates to: left thigh, right thigh  Pain - numeric: 8/10  Pain is: worse during the day  Aggravated by: bending, position, sitting, standing, twisting  Stiffness is present: all day  leg pain: Yes  numbness: Yes  paresthesias: Yes  tingling: Yes  Pain severity: moderate  Improvement on treatment: mild    Patient Care Team:  Que Terrell MD as PCP - General (Internal Medicine)  Sav Bran MD as Consulting Physician (Ophthalmology)  Carloz Aguirre MA as Care Coordinator    Patient Active Problem List    Diagnosis Date Noted    Poor posture 12/20/2023    Decreased range of motion of trunk and back 12/20/2023    Decreased strength of trunk and back 12/20/2023    Degenerative disc disease, cervical 07/17/2019 July 2019 XR cervical spine - No acute fracture or dislocation.  Normal alignment without significant listhesis.  2. Mild/moderate degenerative disc disease at C5-6 greater than C6-7.  Consider PT referral      Overweight (BMI 25.0-29.9) 07/17/2019 Sept 2023 Body mass index is 27.44 kg/m².        Dyslipidemia 07/17/2019     The 10-year ASCVD risk score (Shahnaz DK, et al., 2019) is: 5.7%  Lp(a) - severely elevated  ApoB - very elevated  Decision to initiate statin with rosuvastatin for primary prevention given risk-enhancing conditions    "      PAST MEDICAL PROBLEMS, PAST SURGICAL HISTORY: please see relevant portions of the electronic medical record    ALLERGIES AND MEDICATIONS: updated and reviewed.  Medication List with Changes/Refills   Current Medications    ROSUVASTATIN (CRESTOR) 10 MG TABLET    Take 1 tablet (10 mg total) by mouth once daily.      Review of systems   Chest pain as above   No dyspnea no chronic cough   No abdominal pain.  Regular bowel movements.  No blood in stool.    No dysuria.  Nocturia once at night, not unbearable.  Some hesitation but not enough that he would consider medication       Objective:   Objective   Physical Exam   Vitals:    09/04/24 1105   BP: 132/78   Pulse: 64   Temp: 98.6 °F (37 °C)   TempSrc: Oral   SpO2: 99%   Weight: 85.6 kg (188 lb 11.4 oz)   Height: 5' 9" (1.753 m)    Body mass index is 27.87 kg/m².  Weight: 85.6 kg (188 lb 11.4 oz)   Height: 5' 9" (175.3 cm)     Physical Exam  Constitutional:       Appearance: Normal appearance. He is well-developed.   HENT:      Right Ear: Tympanic membrane and external ear normal.      Left Ear: Tympanic membrane and external ear normal.      Nose: Nose normal.   Eyes:      General: No scleral icterus.     Conjunctiva/sclera: Conjunctivae normal.   Neck:      Thyroid: No thyroid mass or thyromegaly.      Trachea: Trachea normal.   Cardiovascular:      Rate and Rhythm: Normal rate and regular rhythm.      Heart sounds: Normal heart sounds, S1 normal and S2 normal. No murmur heard.  Pulmonary:      Effort: Pulmonary effort is normal.      Breath sounds: Normal breath sounds.   Abdominal:      General: There is no distension.      Palpations: Abdomen is soft. There is no hepatomegaly, splenomegaly or mass.      Tenderness: There is no abdominal tenderness.   Musculoskeletal:         General: No deformity.      Right lower leg: No edema.      Left lower leg: No edema.   Lymphadenopathy:      Cervical: No cervical adenopathy.   Skin:     General: Skin is warm and dry. "      Findings: No rash (on exposed skin).      Comments: On exposed skin   Neurological:      Mental Status: He is alert and oriented to person, place, and time.      Cranial Nerves: No cranial nerve deficit.      Sensory: No sensory deficit.      Deep Tendon Reflexes: Reflexes are normal and symmetric.   Psychiatric:         Speech: Speech normal.         Behavior: Behavior normal.         Thought Content: Thought content normal.         Judgment: Judgment normal.

## 2024-09-04 ENCOUNTER — HOSPITAL ENCOUNTER (OUTPATIENT)
Dept: RADIOLOGY | Facility: HOSPITAL | Age: 49
Discharge: HOME | End: 2024-09-04
Attending: INTERNAL MEDICINE
Payer: COMMERCIAL

## 2024-09-04 ENCOUNTER — OFFICE VISIT (OUTPATIENT)
Dept: FAMILY MEDICINE | Facility: CLINIC | Age: 49
End: 2024-09-04
Payer: COMMERCIAL

## 2024-09-04 VITALS
SYSTOLIC BLOOD PRESSURE: 132 MMHG | DIASTOLIC BLOOD PRESSURE: 78 MMHG | TEMPERATURE: 99 F | WEIGHT: 188.69 LBS | OXYGEN SATURATION: 99 % | HEIGHT: 69 IN | HEART RATE: 64 BPM | BODY MASS INDEX: 27.95 KG/M2

## 2024-09-04 DIAGNOSIS — M54.41 CHRONIC BILATERAL LOW BACK PAIN WITH BILATERAL SCIATICA: ICD-10-CM

## 2024-09-04 DIAGNOSIS — Z00.00 NORMAL PHYSICAL EXAM: Primary | ICD-10-CM

## 2024-09-04 DIAGNOSIS — M50.30 DEGENERATIVE DISC DISEASE, CERVICAL: ICD-10-CM

## 2024-09-04 DIAGNOSIS — M54.42 CHRONIC BILATERAL LOW BACK PAIN WITH BILATERAL SCIATICA: ICD-10-CM

## 2024-09-04 DIAGNOSIS — G89.29 CHRONIC BILATERAL LOW BACK PAIN WITH BILATERAL SCIATICA: ICD-10-CM

## 2024-09-04 DIAGNOSIS — I77.810 AORTIC ROOT DILATATION: ICD-10-CM

## 2024-09-04 DIAGNOSIS — E78.5 DYSLIPIDEMIA: ICD-10-CM

## 2024-09-04 PROCEDURE — 72040 X-RAY EXAM NECK SPINE 2-3 VW: CPT | Mod: 26,,, | Performed by: RADIOLOGY

## 2024-09-04 PROCEDURE — 99396 PREV VISIT EST AGE 40-64: CPT | Mod: S$GLB,,, | Performed by: INTERNAL MEDICINE

## 2024-09-04 PROCEDURE — 99999 PR PBB SHADOW E&M-EST. PATIENT-LVL IV: CPT | Mod: PBBFAC,,, | Performed by: INTERNAL MEDICINE

## 2024-09-04 PROCEDURE — 72040 X-RAY EXAM NECK SPINE 2-3 VW: CPT | Mod: TC,FY,PO

## 2024-09-04 PROCEDURE — 72110 X-RAY EXAM L-2 SPINE 4/>VWS: CPT | Mod: 26,,, | Performed by: RADIOLOGY

## 2024-09-04 PROCEDURE — 72110 X-RAY EXAM L-2 SPINE 4/>VWS: CPT | Mod: TC,FY,PO

## 2024-09-04 NOTE — PROGRESS NOTES
Vertebral bodies are intact.  There is some narrowing of the C 5-C6 disc space.  No other significant findings.     Results to pt via [employer]. Referred to pain clinic at [facility]

## 2024-09-04 NOTE — PROGRESS NOTES
Vertebral bodies are intact.  Disc spaces are maintained.  No instability in flexion and extension.  No bony abnormalities are noted     Referred to pain clinic at [facility]

## 2024-09-09 ENCOUNTER — OFFICE VISIT (OUTPATIENT)
Dept: PAIN MEDICINE | Facility: CLINIC | Age: 49
End: 2024-09-09
Payer: COMMERCIAL

## 2024-09-09 VITALS
WEIGHT: 184.5 LBS | OXYGEN SATURATION: 100 % | SYSTOLIC BLOOD PRESSURE: 137 MMHG | BODY MASS INDEX: 27.25 KG/M2 | RESPIRATION RATE: 18 BRPM | HEART RATE: 48 BPM | DIASTOLIC BLOOD PRESSURE: 76 MMHG

## 2024-09-09 DIAGNOSIS — M47.816 LUMBAR SPONDYLOSIS: ICD-10-CM

## 2024-09-09 DIAGNOSIS — M54.41 CHRONIC BILATERAL LOW BACK PAIN WITH BILATERAL SCIATICA: ICD-10-CM

## 2024-09-09 DIAGNOSIS — M47.812 CERVICAL SPONDYLOSIS: Primary | ICD-10-CM

## 2024-09-09 DIAGNOSIS — M54.42 CHRONIC BILATERAL LOW BACK PAIN WITH BILATERAL SCIATICA: ICD-10-CM

## 2024-09-09 DIAGNOSIS — M50.30 DEGENERATIVE DISC DISEASE, CERVICAL: ICD-10-CM

## 2024-09-09 DIAGNOSIS — M50.30 DDD (DEGENERATIVE DISC DISEASE), CERVICAL: ICD-10-CM

## 2024-09-09 DIAGNOSIS — M51.36 DDD (DEGENERATIVE DISC DISEASE), LUMBAR: ICD-10-CM

## 2024-09-09 DIAGNOSIS — G89.29 CHRONIC BILATERAL LOW BACK PAIN WITH BILATERAL SCIATICA: ICD-10-CM

## 2024-09-09 PROBLEM — M51.369 DDD (DEGENERATIVE DISC DISEASE), LUMBAR: Status: ACTIVE | Noted: 2024-09-09

## 2024-09-09 PROCEDURE — 99204 OFFICE O/P NEW MOD 45 MIN: CPT | Mod: S$GLB,,, | Performed by: PAIN MEDICINE

## 2024-09-09 PROCEDURE — 99999 PR PBB SHADOW E&M-EST. PATIENT-LVL IV: CPT | Mod: PBBFAC,,, | Performed by: PAIN MEDICINE

## 2024-09-09 NOTE — PROGRESS NOTES
Subjective:     Patient ID: Paul Frank is a 48 y.o. male    Chief Complaint: Low-back Pain (Midline dull achy pain with some sharpness with movement wit tingling and  numbness)      Referred by: Raudel Viera MD      HPI:    Initial Encounter (9/9/24):  Paul Frank is a 48 y.o. male who presents today with chronic neck and low back pain.  These pains have been present for over 20 years.  He associates these pains with activities undertaken while in the .  Low back pain is worse than neck pain.  Neck pain is located the midline cervical spine and radiates to the right upper back.  Pain will also radiate to the right upper extremity just distal to the elbow.  Does report some occasional numbness and tingling in the right upper extremity.  Denies any focal weakness or bowel bladder dysfunction.  Pain is intermittent.  Worse with certain positions or activities.  Low back pain is located the midline lower lumbar spine.  It will radiate to the bilateral lumbar paraspinal regions and into the buttocks, posterior thighs, posterior calves.  Does have some occasional numbness and tingling in these areas as well.  Denies any focal weakness or bowel bladder dysfunction.  Low back pain is constant.  Worse with prolonged sitting, prolonged standing and prolonged forward flexion.   This pain is described in detail below.    Physical Therapy:  Not recently    Non-pharmacologic Treatment:  Rest helps         TENS?  No    Pain Medications:         Currently taking:  Ibuprofen    Has tried in the past:      Has not tried:  Opioids, Tylenol, Muscle relaxants, TCAs, SNRIs, anticonvulsants, topical creams    Blood thinners:  None    Interventional Therapies:  None    Relevant Surgeries:  None    Affecting sleep?  Yes    Affecting daily activities? yes    Depressive symptoms? No          SI/HI? No    Work status: Employed    Pain Scores:    Best:       5/10  Worst:     9/10  Usually:   7/10  Today:    7/10    Pain  Disability Index  Family/Home Responsibilities:: 7  Recreation:: 7  Social Activity:: 7  Occupation:: 7  Sexual Behavior:: 7  Self Care:: 5  Life-Support Activities:: 8  Pain Disability Index (PDI): 48    Review of Systems   Constitutional:  Negative for activity change, appetite change, chills, fatigue, fever and unexpected weight change.   HENT:  Negative for hearing loss.    Eyes:  Negative for visual disturbance.   Respiratory:  Negative for chest tightness and shortness of breath.    Cardiovascular:  Negative for chest pain.   Gastrointestinal:  Negative for abdominal pain, constipation, diarrhea, nausea and vomiting.   Genitourinary:  Negative for difficulty urinating.   Musculoskeletal:  Positive for back pain, myalgias, neck pain and neck stiffness. Negative for gait problem.   Skin:  Negative for rash.   Neurological:  Positive for numbness. Negative for dizziness, weakness, light-headedness and headaches.   Psychiatric/Behavioral:  Positive for sleep disturbance. Negative for hallucinations and suicidal ideas. The patient is not nervous/anxious.        Past Medical History:   Diagnosis Date    Abnormal liver enzymes        Past Surgical History:   Procedure Laterality Date    COLONOSCOPY N/A 12/5/2022    Procedure: COLONOSCOPY;  Surgeon: Nicolle Salcedo MD;  Location: [facility];  Service: Endoscopy;  Laterality: N/A;  instructions portal-LW    COLONOSCOPY N/A 3/10/2023    Procedure: COLONOSCOPY;  Surgeon: Mert Pitt MD;  Location: [facility];  Service: Endoscopy;  Laterality: N/A;  per Dr. Salcedo -Please schedule repeat colonoscopy in 3 months with other GI provider (to take a second look at the area biopsied near the IC valve)  instructions sent via portal -     FOOT FRACTURE SURGERY         Social History     Socioeconomic History    Marital status:      Spouse name: john    Number of children: 2   Occupational History    Occupation:      Employer: [employer]   Tobacco Use    Smoking status:  Never    Smokeless tobacco: Never   Substance and Sexual Activity    Alcohol use: Yes     Alcohol/week: 0.0 standard drinks of alcohol     Types: 1 - 2 Cans of beer per week     Comment: socially    Drug use: No    Sexual activity: Yes     Partners: Female   Social History Narrative    He exercises regularly.    He is a  - [employer]     Social Determinants of Health     Financial Resource Strain: Low Risk  (9/1/2024)    Overall Financial Resource Strain (CARDIA)     Difficulty of Paying Living Expenses: Not hard at all   Food Insecurity: No Food Insecurity (9/1/2024)    Hunger Vital Sign     Worried About Running Out of Food in the Last Year: Never true     Ran Out of Food in the Last Year: Never true   Transportation Needs: No Transportation Needs (9/11/2023)    PRAPARE - Transportation     Lack of Transportation (Medical): No     Lack of Transportation (Non-Medical): No   Physical Activity: Sufficiently Active (9/1/2024)    Exercise Vital Sign     Days of Exercise per Week: 4 days     Minutes of Exercise per Session: 60 min   Stress: Stress Concern Present (9/1/2024)    [employer] [place] [employer] - Occupational Stress Questionnaire     Feeling of Stress : Rather much   Housing Stability: Low Risk  (9/11/2023)    Housing Stability Vital Sign     Unable to Pay for Housing in the Last Year: No     Number of Places Lived in the Last Year: 1     Unstable Housing in the Last Year: No       Review of patient's allergies indicates:  No Known Allergies    Current Outpatient Medications on File Prior to Visit   Medication Sig Dispense Refill    rosuvastatin (CRESTOR) 10 MG tablet Take 1 tablet (10 mg total) by mouth once daily. 90 each 3     No current facility-administered medications on file prior to visit.       Objective:      /76 (BP Location: Left arm, Patient Position: Sitting, BP Method: Medium (Automatic))   Pulse (!) 48   Resp 18   Wt 83.7 kg (184 lb 8.4 oz)   SpO2 100%   BMI 27.25 kg/m²      Exam:  GEN:  Well developed, well nourished.  No acute distress.  Normal pain behavior.  HEENT:  No trauma.  Mucous membranes moist.  Nares patent bilaterally.  PSYCH: Normal affect. Thought content appropriate.  CHEST:  Breathing symmetric.  No audible wheezing.  ABD: Soft, non-tender, non-distended.  SKIN:  Warm, pink, dry.  No rash on exposed areas.    EXT:  No cyanosis, clubbing, or edema.  No color change or changes in nail or hair growth.  NEURO/MUSCULOSKELETAL:  Fully alert, oriented, and appropriate. Speech normal solange. No cranial nerve deficits.   Gait:  Normal.  5/5 motor strength throughout upper and lower extremities.   Sensory:  No sensory deficit in the upper and lower extremities.   Reflexes: 2+ and symmetric throughout.  Negative Darby's bilaterally.  No clonus or spasticity.  C-Spine:  Full ROM with pain on extension more than flexion.  Negative facet loading bilaterally.  Negative Spurling's bilaterally.    Positive TTP over midline lower cervical spine, right cervical paraspinal muscles, right upper trapezius.    L-Spine:  Full ROM with pain on flexion more than extension.  Negative facet loading bilaterally.  Negative SLR bilaterally.    Positive TTP over midline lower lumbar spine, bilateral lower lumbar paraspinals      Imaging:    Narrative & Impression    EXAMINATION:  XR LUMBAR SPINE AP AND LAT WITH FLEX/EXT     CLINICAL HISTORY:  Lumbago with sciatica, left side     TECHNIQUE:  AP and lateral views as well as lateral flexion and extension images are performed through the lumbar spine.     COMPARISON:  None     FINDINGS:  Vertebral bodies are intact.  Disc spaces are maintained.  No instability in flexion and extension.  No bony abnormalities are noted.     Impression:     See above        Electronically signed by:Armando Weiss MD  Date:                                            09/04/2024  Time:                                           12:22       Narrative &  Impression    EXAMINATION:  XR CERVICAL SPINE AP LATERAL     CLINICAL HISTORY:  Other cervical disc degeneration, unspecified cervical region     TECHNIQUE:  AP, lateral and open mouth views of the cervical spine were performed.     COMPARISON:  07/17/2019     FINDINGS:  Vertebral bodies are intact.  There is some narrowing of the C 5-C6 disc space.  No other significant findings.     Impression:     See above        Electronically signed by:Armando Weiss MD  Date:                                            09/04/2024  Time:                                           12:23       Assessment:       Encounter Diagnoses   Name Primary?    Degenerative disc disease, cervical     Chronic bilateral low back pain with bilateral sciatica     Cervical spondylosis Yes    DDD (degenerative disc disease), cervical     DDD (degenerative disc disease), lumbar     Lumbar spondylosis      Plan:       Paul was seen today for low-back pain.    Diagnoses and all orders for this visit:    Cervical spondylosis    Degenerative disc disease, cervical  -     Ambulatory referral/consult to Pain Clinic    Chronic bilateral low back pain with bilateral sciatica  -     Ambulatory referral/consult to Pain Clinic    DDD (degenerative disc disease), cervical  -     Ambulatory referral/consult to Physical/Occupational Therapy; Future    DDD (degenerative disc disease), lumbar  -     Ambulatory referral/consult to Physical/Occupational Therapy; Future    Lumbar spondylosis        Paul Frank is a 48 y.o. male with chronic neck and low back pain.  Neck pain does seem to have an axial component but also with some right upper extremity radicular symptoms.  No overt neurologic deficits on examination.  Does have degenerative disc disease worse at the C5-6 level, but also the C4-5 and C6-7 levels.  Lower suspicion for facet mediated pain.  Low back pain seems to be primarily axial.  I do have suspicion for anterior column pain; specifically  discogenic etiology.  Likely has some degree of somatic referred pain into the lower extremities.  Can not rule out lower lumbar facet mediated pain as well.  No overt neurologic deficits.  Low suspicion for radiculopathy..    Pertinent imaging studies reviewed by me. Imaging results were discussed with patient.  Can continue ibuprofen as needed for pain.    Refer to PT for ROM, strengthening, stretching and HEP.  Return to clinic in 6 weeks or sooner if needed.  At that time we will discuss efficacy of physical therapy/home exercise program.  May consider cervical and/or lumbar MRIs and possibly interventional procedures if pain persists or worsens.            This note was created by combination of typed  and M-Modal dictation. Transcription and phonetic errors may be present.  If there are any questions, please contact me.

## 2024-09-18 ENCOUNTER — OFFICE VISIT (OUTPATIENT)
Dept: CARDIOLOGY | Facility: CLINIC | Age: 49
End: 2024-09-18
Payer: COMMERCIAL

## 2024-09-18 VITALS
OXYGEN SATURATION: 99 % | DIASTOLIC BLOOD PRESSURE: 83 MMHG | SYSTOLIC BLOOD PRESSURE: 129 MMHG | HEART RATE: 59 BPM | WEIGHT: 183.44 LBS | BODY MASS INDEX: 27.17 KG/M2 | RESPIRATION RATE: 18 BRPM | HEIGHT: 69 IN

## 2024-09-18 DIAGNOSIS — R00.1 BRADYCARDIA: ICD-10-CM

## 2024-09-18 DIAGNOSIS — E78.5 DYSLIPIDEMIA: ICD-10-CM

## 2024-09-18 DIAGNOSIS — I77.810 ASCENDING AORTA DILATION: Primary | ICD-10-CM

## 2024-09-18 PROCEDURE — 93000 ELECTROCARDIOGRAM COMPLETE: CPT | Mod: S$GLB,,, | Performed by: INTERNAL MEDICINE

## 2024-09-18 PROCEDURE — 99999 PR PBB SHADOW E&M-EST. PATIENT-LVL IV: CPT | Mod: PBBFAC,,, | Performed by: INTERNAL MEDICINE

## 2024-09-18 PROCEDURE — 99203 OFFICE O/P NEW LOW 30 MIN: CPT | Mod: S$GLB,,, | Performed by: INTERNAL MEDICINE

## 2024-09-18 NOTE — PROGRESS NOTES
CARDIOLOGY CLINIC VISIT        HISTORY OF PRESENT ILLNESS:     09/18/2024: Paul Frank presents for cardiovascular evaluation.  Echocardiogram from 02/22/2024 showed normal left ventricular systolic function with estimated ejection fraction of 55-60%.  Mildly dilated ascending aorta, 3.46 cm.  Bubble study reported as positive at rest and with Valsalva.  Feels good.  No complaints.  Exercises regularly.    CARDIOVASCULAR HISTORY:     Dilated ascending aorta    PAST MEDICAL HISTORY:     Past Medical History:   Diagnosis Date    Abnormal liver enzymes        PAST SURGICAL HISTORY:     Past Surgical History:   Procedure Laterality Date    COLONOSCOPY N/A 12/5/2022    Procedure: COLONOSCOPY;  Surgeon: Nicolle Salcedo MD;  Location: [facility] ENDO;  Service: Endoscopy;  Laterality: N/A;  instructions portal-    COLONOSCOPY N/A 3/10/2023    Procedure: COLONOSCOPY;  Surgeon: Mert Pitt MD;  Location: [facility] ENDO;  Service: Endoscopy;  Laterality: N/A;  per Dr. Salcedo -Please schedule repeat colonoscopy in 3 months with other GI provider (to take a second look at the area biopsied near the IC valve)  instructions sent via portal -     FOOT FRACTURE SURGERY         ALLERGIES AND MEDICATION:   Review of patient's allergies indicates:  No Known Allergies     Medication List            Accurate as of September 18, 2024  9:02 AM. If you have any questions, ask your nurse or doctor.                CONTINUE taking these medications      rosuvastatin 10 MG tablet  Commonly known as: CRESTOR  Take 1 tablet (10 mg total) by mouth once daily.              SOCIAL HISTORY:     Social History     Socioeconomic History    Marital status:      Spouse name: john    Number of children: 2   Occupational History    Occupation:      Employer: [employer]   Tobacco Use    Smoking status: Never    Smokeless tobacco: Never   Substance and Sexual Activity    Alcohol use: Yes     Alcohol/week: 0.0 standard drinks of alcohol     Types: 1 -  2 Cans of beer per week     Comment: socially    Drug use: No    Sexual activity: Yes     Partners: Female   Social History Narrative    He exercises regularly.    He is a  - [employer]     Social Determinants of Health     Financial Resource Strain: Low Risk  (9/1/2024)    Overall Financial Resource Strain (CARDIA)     Difficulty of Paying Living Expenses: Not hard at all   Food Insecurity: No Food Insecurity (9/1/2024)    Hunger Vital Sign     Worried About Running Out of Food in the Last Year: Never true     Ran Out of Food in the Last Year: Never true   Transportation Needs: No Transportation Needs (9/11/2023)    PRAPARE - Transportation     Lack of Transportation (Medical): No     Lack of Transportation (Non-Medical): No   Physical Activity: Sufficiently Active (9/1/2024)    Exercise Vital Sign     Days of Exercise per Week: 4 days     Minutes of Exercise per Session: 60 min   Stress: Stress Concern Present (9/1/2024)    [employer] - Occupational Stress Questionnaire     Feeling of Stress : Rather much   Housing Stability: Low Risk  (9/11/2023)    Housing Stability Vital Sign     Unable to Pay for Housing in the Last Year: No     Number of Places Lived in the Last Year: 1     Unstable Housing in the Last Year: No       FAMILY HISTORY:     Family History   Problem Relation Name Age of Onset    Hyperlipidemia Mother      Heart disease Father      Prostate cancer Father      Coronary artery disease Father          late 60s/early 70s    Cancer Sister          lung cancer - 50s    No Known Problems Brother      Diabetes Maternal Grandfather      Colon cancer Neg Hx         REVIEW OF SYSTEMS:   Review of Systems   Constitutional:  Negative for chills, diaphoresis, fever, malaise/fatigue and weight loss.   HENT:  Negative for congestion, hearing loss, sinus pain, sore throat and tinnitus.    Eyes:  Negative for blurred vision, double vision, photophobia and pain.   Respiratory:  Negative  for cough, hemoptysis, sputum production, shortness of breath, wheezing and stridor.    Cardiovascular:  Negative for chest pain, palpitations, orthopnea, claudication, leg swelling and PND.   Gastrointestinal:  Negative for abdominal pain, blood in stool, heartburn, melena, nausea and vomiting.   Musculoskeletal:  Negative for back pain, falls, joint pain, myalgias and neck pain.   Neurological:  Negative for dizziness, tingling, tremors, sensory change, speech change, focal weakness, seizures, loss of consciousness, weakness and headaches.   Endo/Heme/Allergies:  Does not bruise/bleed easily.   Psychiatric/Behavioral:  Negative for depression, memory loss and substance abuse. The patient is not nervous/anxious.        PHYSICAL EXAM:   There were no vitals filed for this visit. There is no height or weight on file to calculate BMI.            Physical Exam  Vitals reviewed.   Constitutional:       General: He is not in acute distress.     Appearance: Normal appearance. He is well-developed. He is not diaphoretic.   HENT:      Head: Normocephalic.   Eyes:      Extraocular Movements: Extraocular movements intact.   Neck:      Vascular: No carotid bruit or JVD.   Cardiovascular:      Rate and Rhythm: Normal rate and regular rhythm.      Pulses: Normal pulses.      Heart sounds: Normal heart sounds.   Pulmonary:      Effort: Pulmonary effort is normal.      Breath sounds: Normal breath sounds. No wheezing, rhonchi or rales.   Chest:      Chest wall: No tenderness.   Abdominal:      General: Bowel sounds are normal. There is no distension.      Palpations: Abdomen is soft.      Tenderness: There is no abdominal tenderness.   Musculoskeletal:      Right lower leg: No edema.      Left lower leg: No edema.   Skin:     General: Skin is warm and dry.      Coloration: Skin is not jaundiced or pale.      Findings: No erythema.   Neurological:      General: No focal deficit present.      Mental Status: He is alert and oriented  to person, place, and time.      Motor: No weakness.   Psychiatric:         Speech: Speech normal.         Behavior: Behavior normal.         Thought Content: Thought content normal.         DATA:   EKG: (personally reviewed tracing)  09/18/2024-marked sinus bradycardia, nonspecific ST abnormality      Laboratory:  CBC:  Recent Labs   Lab 09/08/22  0837 09/04/24  1210   WBC 4.93 4.78   Hemoglobin 14.3 14.4   Hematocrit 46.5 46.5   Platelets 336 287       CHEMISTRIES:  Recent Labs   Lab 09/08/22  0837 09/12/23  0952 09/04/24  1210   Glucose 85 79 66 L   Sodium 139 139 139   Potassium 4.1 4.4 4.5   BUN 14 12 11   Creatinine 1.1 0.9 1.1   Calcium 9.5 9.9 9.9       CARDIAC BIOMARKERS:        COAGS:        LIPIDS/LFTS:  Recent Labs   Lab 09/08/22  0837 09/12/23  0952 09/04/24  1210   Cholesterol 210 H 209 H 178   Triglycerides 57 59 80   HDL 59 54 55   LDL Cholesterol 139.6 143.2 107.0   Non-HDL Cholesterol 151 155 123   AST 27 21 26   ALT 29 21 37       Hemoglobin A1C   Date Value Ref Range Status   09/04/2024 5.5 4.0 - 5.6 % Final     Comment:     ADA Screening Guidelines:  5.7-6.4%  Consistent with prediabetes  >or=6.5%  Consistent with diabetes    High levels of fetal hemoglobin interfere with the HbA1C  assay. Heterozygous hemoglobin variants (HbS, HgC, etc)do  not significantly interfere with this assay.   However, presence of multiple variants may affect accuracy.     09/12/2023 5.4 4.0 - 5.6 % Final     Comment:     ADA Screening Guidelines:  5.7-6.4%  Consistent with prediabetes  >or=6.5%  Consistent with diabetes    High levels of fetal hemoglobin interfere with the HbA1C  assay. Heterozygous hemoglobin variants (HbS, HgC, etc)do  not significantly interfere with this assay.   However, presence of multiple variants may affect accuracy.     07/17/2019 5.6 4.0 - 5.6 % Final     Comment:     ADA Screening Guidelines:  5.7-6.4%  Consistent with prediabetes  >or=6.5%  Consistent with diabetes  High levels of fetal  hemoglobin interfere with the HbA1C  assay. Heterozygous hemoglobin variants (HbS, HgC, etc)do  not significantly interfere with this assay.   However, presence of multiple variants may affect accuracy.          The 10-year ASCVD risk score (Shahnaz CALDERA, et al., 2019) is: 5.6%    Values used to calculate the score:      Age: 48 years      Sex: Male      Is Non- : Yes      Diabetic: No      Tobacco smoker: No      Systolic Blood Pressure: 137 mmHg      Is BP treated: No      HDL Cholesterol: 55 mg/dL      Total Cholesterol: 178 mg/dL      Cardiovascular Testing:    Echocardiogram 02/22/2024:      CONCLUSIONS     Normal left ventricular systolic function.     Left ventricular ejection fraction is estimated at 55-60%.     Normal right ventricular systolic function.     Mildly dilated  ascending aorta measuring 3.46 cm.     Bubble study was done and was positive at rest and with valsalva.     No cardiac monitor results found for the past 12 months         ASSESSMENT:     Dilated ascending aorta  Hyperlipidemia  Bradycardia    PLAN:     Dilated ascending aorta: Repeat echocardiogram.  Hyperlipidemia: Continue current management.  Bradycardia: Asymptomatic.  Return to clinic 1 month.         Jakub Rueda MD, MPH, FACC, [facility]

## 2024-09-19 LAB
OHS QRS DURATION: 86 MS
OHS QTC CALCULATION: 377 MS

## 2024-09-28 ENCOUNTER — CLINICAL SUPPORT (OUTPATIENT)
Dept: OTHER | Facility: CLINIC | Age: 49
End: 2024-09-28

## 2024-09-28 DIAGNOSIS — Z00.8 ENCOUNTER FOR OTHER GENERAL EXAMINATION: ICD-10-CM

## 2024-10-01 VITALS — DIASTOLIC BLOOD PRESSURE: 79 MMHG | SYSTOLIC BLOOD PRESSURE: 137 MMHG

## 2024-10-01 LAB
GLUCOSE SERPL-MCNC: 145 MG/DL (ref 60–140)
HDLC SERPL-MCNC: 41 MG/DL
POC CHOLESTEROL, LDL (DOCK): 105 MG/DL
POC CHOLESTEROL, TOTAL: 159 MG/DL
TRIGL SERPL-MCNC: 66 MG/DL

## 2024-10-14 ENCOUNTER — HOSPITAL ENCOUNTER (OUTPATIENT)
Dept: CARDIOLOGY | Facility: HOSPITAL | Age: 49
Discharge: HOME | End: 2024-10-14
Attending: INTERNAL MEDICINE
Payer: COMMERCIAL

## 2024-10-14 DIAGNOSIS — I77.810 ASCENDING AORTA DILATION: ICD-10-CM

## 2024-10-14 LAB
ASCENDING AORTA: 3.14 CM
AV INDEX (PROSTH): 0.69
AV MEAN GRADIENT: 3.8 MMHG
AV PEAK GRADIENT: 6.8 MMHG
AV VALVE AREA BY VELOCITY RATIO: 1.7 CM²
AV VALVE AREA: 2.2 CM²
AV VELOCITY RATIO: 0.54
CV ECHO LV RWT: 0.33 CM
DOP CALC AO PEAK VEL: 1.3 M/S
DOP CALC AO VTI: 27.9 CM
DOP CALC LVOT AREA: 3.1 CM2
DOP CALC LVOT DIAMETER: 2 CM
DOP CALC LVOT PEAK VEL: 0.7 M/S
DOP CALC LVOT STROKE VOLUME: 60.3 CM3
DOP CALC MV VTI: 37.6 CM
DOP CALCLVOT PEAK VEL VTI: 19.2 CM
E WAVE DECELERATION TIME: 211.74 MSEC
E/A RATIO: 1.9
E/E' RATIO: 8 M/S
ECHO LV POSTERIOR WALL: 0.9 CM (ref 0.6–1.1)
FRACTIONAL SHORTENING: 33.3 % (ref 28–44)
INTERVENTRICULAR SEPTUM: 1.1 CM (ref 0.6–1.1)
IVC DIAMETER: 1.56 CM
IVRT: 108.47 MSEC
LA MAJOR: 5.46 CM
LA MINOR: 5.08 CM
LA WIDTH: 4.3 CM
LEFT ATRIUM SIZE: 3.84 CM
LEFT ATRIUM VOLUME: 73.87 CM3
LEFT INTERNAL DIMENSION IN SYSTOLE: 3.6 CM (ref 2.1–4)
LEFT VENTRICLE DIASTOLIC VOLUME: 140.45 ML
LEFT VENTRICLE SYSTOLIC VOLUME: 53.2 ML
LEFT VENTRICULAR INTERNAL DIMENSION IN DIASTOLE: 5.4 CM (ref 3.5–6)
LEFT VENTRICULAR MASS: 206.7 G
LV LATERAL E/E' RATIO: 5.85 M/S
LV SEPTAL E/E' RATIO: 12.67 M/S
LVED V (TEICH): 140.45 ML
LVES V (TEICH): 53.2 ML
LVOT MG: 1.35 MMHG
LVOT MV: 0.56 CM/S
MV MEAN GRADIENT: 1 MMHG
MV PEAK A VEL: 0.4 M/S
MV PEAK E VEL: 0.76 M/S
MV PEAK GRADIENT: 3 MMHG
MV STENOSIS PRESSURE HALF TIME: 61.4 MS
MV VALVE AREA BY CONTINUITY EQUATION: 1.6 CM2
MV VALVE AREA P 1/2 METHOD: 3.58 CM2
OHS CV RV/LV RATIO: 0.72 CM
PISA TR MAX VEL: 1.7 M/S
PV PEAK GRADIENT: 4 MMHG
PV PEAK VELOCITY: 0.97 M/S
RA MAJOR: 5.22 CM
RA PRESSURE ESTIMATED: 3 MMHG
RA WIDTH: 3.6 CM
RIGHT VENTRICLE DIASTOLIC BASEL DIMENSION: 3.9 CM
RIGHT VENTRICULAR END-DIASTOLIC DIMENSION: 3.91 CM
RV TB RVSP: 5 MMHG
RV TISSUE DOPPLER FREE WALL SYSTOLIC VELOCITY 1 (APICAL 4 CHAMBER VIEW): 11.77 CM/S
SINUS: 3.5 CM
STJ: 2.45 CM
TDI LATERAL: 0.13 M/S
TDI SEPTAL: 0.06 M/S
TDI: 0.1 M/S
TR MAX PG: 12 MMHG
TRICUSPID ANNULAR PLANE SYSTOLIC EXCURSION: 1.9 CM
TV REST PULMONARY ARTERY PRESSURE: 15 MMHG

## 2024-10-14 PROCEDURE — 93306 TTE W/DOPPLER COMPLETE: CPT | Mod: 26,,, | Performed by: INTERNAL MEDICINE

## 2024-10-14 PROCEDURE — 93306 TTE W/DOPPLER COMPLETE: CPT

## 2024-10-18 ENCOUNTER — OFFICE VISIT (OUTPATIENT)
Dept: CARDIOLOGY | Facility: CLINIC | Age: 49
End: 2024-10-18
Payer: COMMERCIAL

## 2024-10-18 VITALS
HEART RATE: 58 BPM | RESPIRATION RATE: 18 BRPM | OXYGEN SATURATION: 99 % | HEIGHT: 69 IN | DIASTOLIC BLOOD PRESSURE: 82 MMHG | BODY MASS INDEX: 27.11 KG/M2 | SYSTOLIC BLOOD PRESSURE: 120 MMHG | WEIGHT: 183 LBS

## 2024-10-18 DIAGNOSIS — R00.1 BRADYCARDIA: ICD-10-CM

## 2024-10-18 DIAGNOSIS — E78.5 DYSLIPIDEMIA: ICD-10-CM

## 2024-10-18 DIAGNOSIS — I77.810 ASCENDING AORTA DILATION: Primary | ICD-10-CM

## 2024-10-18 DIAGNOSIS — Q21.10 ATRIAL SEPTAL DEFECT: ICD-10-CM

## 2024-10-18 PROCEDURE — 99999 PR PBB SHADOW E&M-EST. PATIENT-LVL III: CPT | Mod: PBBFAC,,, | Performed by: INTERNAL MEDICINE

## 2024-10-18 NOTE — PROGRESS NOTES
CARDIOLOGY CLINIC VISIT        HISTORY OF PRESENT ILLNESS:     09/18/2024: Paul Frank presents for cardiovascular evaluation.  Echocardiogram from 02/22/2024 showed normal left ventricular systolic function with estimated ejection fraction of 55-60%.  Mildly dilated ascending aorta, 3.46 cm.  Bubble study reported as positive at rest and with Valsalva.  Feels good.  No complaints.  Exercises regularly.    10/18/2024:  Echocardiogram showed normal left ventricular systolic function estimated ejection fraction of 55-60%.  Left atrium is moderately dilated.  Agitated saline study of the atrial septum was positive consistent with intracardiac shunt at atrial level.  The right atrium is mildly dilated.  Mild tricuspid regurgitation.  Normal pulmonary artery systolic pressure.    CARDIOVASCULAR HISTORY:     Dilated ascending aorta  Atrial septal defect    PAST MEDICAL HISTORY:     Past Medical History:   Diagnosis Date    Abnormal liver enzymes        PAST SURGICAL HISTORY:     Past Surgical History:   Procedure Laterality Date    COLONOSCOPY N/A 12/5/2022    Procedure: COLONOSCOPY;  Surgeon: Nicolle Salcedo MD;  Location: [facility] ENDO;  Service: Endoscopy;  Laterality: N/A;  instructions portal-    COLONOSCOPY N/A 3/10/2023    Procedure: COLONOSCOPY;  Surgeon: Mert Pitt MD;  Location: [facility] ENDO;  Service: Endoscopy;  Laterality: N/A;  per Dr. Salcedo -Please schedule repeat colonoscopy in 3 months with other GI provider (to take a second look at the area biopsied near the IC valve)  instructions sent via portal -     FOOT FRACTURE SURGERY         ALLERGIES AND MEDICATION:   Review of patient's allergies indicates:  No Known Allergies     Medication List            Accurate as of October 18, 2024 10:50 AM. If you have any questions, ask your nurse or doctor.                CONTINUE taking these medications      rosuvastatin 10 MG tablet  Commonly known as: CRESTOR  Take 1 tablet (10 mg total) by mouth once daily.               SOCIAL HISTORY:     Social History     Socioeconomic History    Marital status:      Spouse name: john    Number of children: 2   Occupational History    Occupation:      Employer: [employer]   Tobacco Use    Smoking status: Never    Smokeless tobacco: Never   Substance and Sexual Activity    Alcohol use: Yes     Alcohol/week: 0.0 standard drinks of alcohol     Types: 1 - 2 Cans of beer per week     Comment: socially    Drug use: No    Sexual activity: Yes     Partners: Female   Social History Narrative    He exercises regularly.    He is a  - [employer]     Social Drivers of Health     Financial Resource Strain: Low Risk  (9/1/2024)    Overall Financial Resource Strain (CARDIA)     Difficulty of Paying Living Expenses: Not hard at all   Food Insecurity: No Food Insecurity (9/1/2024)    Hunger Vital Sign     Worried About Running Out of Food in the Last Year: Never true     Ran Out of Food in the Last Year: Never true   Transportation Needs: No Transportation Needs (9/11/2023)    PRAPARE - Transportation     Lack of Transportation (Medical): No     Lack of Transportation (Non-Medical): No   Physical Activity: Sufficiently Active (9/1/2024)    Exercise Vital Sign     Days of Exercise per Week: 4 days     Minutes of Exercise per Session: 60 min   Stress: Stress Concern Present (9/1/2024)    [employer] - Occupational Stress Questionnaire     Feeling of Stress : Rather much   Housing Stability: Low Risk  (9/11/2023)    Housing Stability Vital Sign     Unable to Pay for Housing in the Last Year: No     Number of Places Lived in the Last Year: 1     Unstable Housing in the Last Year: No       FAMILY HISTORY:     Family History   Problem Relation Name Age of Onset    Hyperlipidemia Mother      Heart disease Father      Prostate cancer Father      Coronary artery disease Father          late 60s/early 70s    Cancer Sister          lung cancer - 50s    No Known Problems  "Brother      Diabetes Maternal Grandfather      Colon cancer Neg Hx         REVIEW OF SYSTEMS:   Review of Systems   Constitutional:  Negative for chills, diaphoresis, fever, malaise/fatigue and weight loss.   HENT:  Negative for congestion, hearing loss, sinus pain, sore throat and tinnitus.    Eyes:  Negative for blurred vision, double vision, photophobia and pain.   Respiratory:  Negative for cough, hemoptysis, sputum production, shortness of breath, wheezing and stridor.    Cardiovascular:  Negative for chest pain, palpitations, orthopnea, claudication, leg swelling and PND.   Gastrointestinal:  Negative for abdominal pain, blood in stool, heartburn, melena, nausea and vomiting.   Musculoskeletal:  Negative for back pain, falls, joint pain, myalgias and neck pain.   Neurological:  Negative for dizziness, tingling, tremors, sensory change, speech change, focal weakness, seizures, loss of consciousness, weakness and headaches.   Endo/Heme/Allergies:  Does not bruise/bleed easily.   Psychiatric/Behavioral:  Negative for depression, memory loss and substance abuse. The patient is not nervous/anxious.        PHYSICAL EXAM:     Vitals:    10/18/24 0914   BP: 120/82   Pulse: (!) 58   Resp: 18    Body mass index is 27.02 kg/m².  Weight: 83 kg (182 lb 15.7 oz)   Height: 5' 9" (175.3 cm)     Physical Exam  Vitals reviewed.   Constitutional:       General: He is not in acute distress.     Appearance: Normal appearance. He is well-developed. He is not diaphoretic.   HENT:      Head: Normocephalic.   Eyes:      Extraocular Movements: Extraocular movements intact.   Neck:      Vascular: No carotid bruit or JVD.   Cardiovascular:      Rate and Rhythm: Normal rate and regular rhythm.      Pulses: Normal pulses.      Heart sounds: Normal heart sounds.   Pulmonary:      Effort: Pulmonary effort is normal.      Breath sounds: Normal breath sounds. No wheezing, rhonchi or rales.   Chest:      Chest wall: No tenderness.   Abdominal: "      General: Bowel sounds are normal. There is no distension.      Palpations: Abdomen is soft.      Tenderness: There is no abdominal tenderness.   Musculoskeletal:      Right lower leg: No edema.      Left lower leg: No edema.   Skin:     General: Skin is warm and dry.      Coloration: Skin is not jaundiced or pale.      Findings: No erythema.   Neurological:      General: No focal deficit present.      Mental Status: He is alert and oriented to person, place, and time.      Motor: No weakness.   Psychiatric:         Speech: Speech normal.         Behavior: Behavior normal.         Thought Content: Thought content normal.         DATA:   EKG: (personally reviewed tracing)  09/18/2024-marked sinus bradycardia, nonspecific ST abnormality      Laboratory:  CBC:  Recent Labs   Lab 09/08/22  0837 09/04/24  1210   WBC 4.93 4.78   Hemoglobin 14.3 14.4   Hematocrit 46.5 46.5   Platelets 336 287       CHEMISTRIES:  Recent Labs   Lab 09/08/22  0837 09/12/23  0952 09/04/24  1210   Glucose 85 79 66 L   Sodium 139 139 139   Potassium 4.1 4.4 4.5   BUN 14 12 11   Creatinine 1.1 0.9 1.1   Calcium 9.5 9.9 9.9       CARDIAC BIOMARKERS:        COAGS:        LIPIDS/LFTS:  Recent Labs   Lab 09/08/22  0837 09/12/23  0952 09/04/24  1210   Cholesterol 210 H 209 H 178   Triglycerides 57 59 80   HDL 59 54 55   LDL Cholesterol 139.6 143.2 107.0   Non-HDL Cholesterol 151 155 123   AST 27 21 26   ALT 29 21 37       Hemoglobin A1C   Date Value Ref Range Status   09/04/2024 5.5 4.0 - 5.6 % Final     Comment:     ADA Screening Guidelines:  5.7-6.4%  Consistent with prediabetes  >or=6.5%  Consistent with diabetes    High levels of fetal hemoglobin interfere with the HbA1C  assay. Heterozygous hemoglobin variants (HbS, HgC, etc)do  not significantly interfere with this assay.   However, presence of multiple variants may affect accuracy.     09/12/2023 5.4 4.0 - 5.6 % Final     Comment:     ADA Screening Guidelines:  5.7-6.4%  Consistent with  prediabetes  >or=6.5%  Consistent with diabetes    High levels of fetal hemoglobin interfere with the HbA1C  assay. Heterozygous hemoglobin variants (HbS, HgC, etc)do  not significantly interfere with this assay.   However, presence of multiple variants may affect accuracy.     07/17/2019 5.6 4.0 - 5.6 % Final     Comment:     ADA Screening Guidelines:  5.7-6.4%  Consistent with prediabetes  >or=6.5%  Consistent with diabetes  High levels of fetal hemoglobin interfere with the HbA1C  assay. Heterozygous hemoglobin variants (HbS, HgC, etc)do  not significantly interfere with this assay.   However, presence of multiple variants may affect accuracy.          The 10-year ASCVD risk score (Shahnaz CALDERA, et al., 2019) is: 4.5%    Values used to calculate the score:      Age: 48 years      Sex: Male      Is Non- : Yes      Diabetic: No      Tobacco smoker: No      Systolic Blood Pressure: 120 mmHg      Is BP treated: No      HDL Cholesterol: 55 mg/dL      Total Cholesterol: 178 mg/dL      Cardiovascular Testing:    Echocardiogram 02/22/2024:      CONCLUSIONS     Normal left ventricular systolic function.     Left ventricular ejection fraction is estimated at 55-60%.     Normal right ventricular systolic function.     Mildly dilated  ascending aorta measuring 3.46 cm.     Bubble study was done and was positive at rest and with valsalva.     No cardiac monitor results found for the past 12 months         ASSESSMENT:     Dilated ascending aorta  Hyperlipidemia  Bradycardia    PLAN:     Dilated ascending aorta:  Monitor.  Hyperlipidemia: Continue current management.  Bradycardia: Asymptomatic.  Atrial septal defect: Monitor.  Return to clinic 1 year.         Jakub Rueda MD, MPH, [facility], [facility]

## 2024-11-21 DIAGNOSIS — E78.5 DYSLIPIDEMIA: ICD-10-CM

## 2024-11-21 RX ORDER — ROSUVASTATIN CALCIUM 10 MG/1
10 TABLET, COATED ORAL
Qty: 90 TABLET | Refills: 0 | Status: SHIPPED | OUTPATIENT
Start: 2024-11-21

## 2024-11-22 NOTE — TELEPHONE ENCOUNTER
Refill Decision Note   Paul Frank  is requesting a refill authorization.  Brief Assessment and Rationale for Refill:  Approve     Medication Therapy Plan:         Comments:     Note composed:11:22 PM 11/21/2024

## 2024-11-22 NOTE — TELEPHONE ENCOUNTER
No care due was identified.  [facility] Embedded Care Due Messages. Reference number: [id number].   11/21/2024 9:40:36 PM CST

## 2025-02-20 DIAGNOSIS — E78.5 DYSLIPIDEMIA: ICD-10-CM

## 2025-02-20 RX ORDER — ROSUVASTATIN CALCIUM 10 MG/1
10 TABLET, COATED ORAL
Qty: 90 TABLET | Refills: 3 | Status: SHIPPED | OUTPATIENT
Start: 2025-02-20

## 2025-02-20 NOTE — TELEPHONE ENCOUNTER
No care due was identified.  [facility] Embedded Care Due Messages. Reference number: [id number].   2/20/2025 8:23:05 AM CST